# Patient Record
Sex: MALE | Race: WHITE | Employment: UNEMPLOYED | URBAN - METROPOLITAN AREA
[De-identification: names, ages, dates, MRNs, and addresses within clinical notes are randomized per-mention and may not be internally consistent; named-entity substitution may affect disease eponyms.]

---

## 2017-01-19 ENCOUNTER — OFFICE VISIT (OUTPATIENT)
Dept: CARDIOLOGY CLINIC | Age: 57
End: 2017-01-19

## 2017-01-19 VITALS
SYSTOLIC BLOOD PRESSURE: 98 MMHG | HEART RATE: 68 BPM | WEIGHT: 166 LBS | DIASTOLIC BLOOD PRESSURE: 67 MMHG | BODY MASS INDEX: 31.34 KG/M2 | OXYGEN SATURATION: 98 % | HEIGHT: 61 IN

## 2017-01-19 DIAGNOSIS — I25.83 CORONARY ARTERY DISEASE DUE TO LIPID RICH PLAQUE: Primary | ICD-10-CM

## 2017-01-19 DIAGNOSIS — I25.10 CORONARY ARTERY DISEASE DUE TO LIPID RICH PLAQUE: Primary | ICD-10-CM

## 2017-01-19 DIAGNOSIS — I10 ESSENTIAL HYPERTENSION WITH GOAL BLOOD PRESSURE LESS THAN 140/90: ICD-10-CM

## 2017-01-19 DIAGNOSIS — E78.00 PURE HYPERCHOLESTEROLEMIA: ICD-10-CM

## 2017-01-19 RX ORDER — ASPIRIN 81 MG/1
81 TABLET ORAL DAILY
Qty: 30 TAB | Refills: 7 | Status: SHIPPED | OUTPATIENT
Start: 2017-01-19

## 2017-01-19 NOTE — PROGRESS NOTES
1. Have you been to the ER, urgent care clinic since your last visit? Hospitalized since your last visit? No    2. Have you seen or consulted any other health care providers outside of the [address] [place] [address] since your last visit? Include any pap smears or colon screening.  No

## 2017-01-19 NOTE — MR AVS SNAPSHOT
Visit Information Date & Time Provider Department Dept. Phone Encounter #  
 1/19/2017 10:45 AM Augusto Brennan MD [facility] at [facility] [id number] Follow-up Instructions Return in about 6 months (around 7/19/2017). Your Appointments 1/19/2017 10:45 AM  
Follow Up with Augusto Brennan MD  
Cardio Specialist at [facility] [address] [facility]) Appt Note: 6 month f/u per tickler, only if it is not raining -km; pt confirmed 1/17/2017  
 [facility] [id number] Upcoming Health Maintenance Date Due Hepatitis C Screening 1960 FOBT Q 1 YEAR AGE 50-75 7/27/2008 INFLUENZA AGE 9 TO ADULT 8/1/2016 DTaP/Tdap/Td series (2 - Td) 11/2/2021 Allergies as of 1/19/2017  Review Complete On: 1/19/2017 By: Harlan Araujo RN Severity Noted Reaction Type Reactions Aspirin  01/16/2015   Intolerance Other (comments) Stomach bleed Current Immunizations  Reviewed on 11/2/2011 Name Date Influenza Vaccine Whole 10/2/2011 Pneumococcal Vaccine (Unspecified Type) 11/2/2011 TDAP Vaccine 11/2/2011 Not reviewed this visit Vitals BP Pulse Height(growth percentile) Weight(growth percentile) SpO2 BMI  
 98/67 68 5' 1\" (1.549 m) 166 lb (75.3 kg) 98% 31.37 kg/m2 Smoking Status Current Some Day Smoker BMI and BSA Data Body Mass Index Body Surface Area  
 31.37 kg/m 2 1.8 m 2 Preferred Pharmacy Pharmacy Name Phone [employer] [facility] [facility] Your Updated Medication List  
  
   
This list is accurate as of: 1/19/17 10:44 AM.  Always use your most recent med list.  
  
  
  
  
 aspirin delayed-release 81 mg tablet Take 1 Tab by mouth daily. atorvastatin 80 mg tablet Commonly known as:  LIPITOR  
TAKE ONE TABLET BY MOUTH NIGHTLY  
  
 butalbital-acetaminophen-caffeine -40 mg per tablet Commonly known as:  Sheirne Elissa Take 1 Tab by mouth two (2) times daily as needed for Pain.  
  
 escitalopram oxalate 10 mg tablet Commonly known as:  Cherre Jews Take 10 mg by mouth daily. furosemide 20 mg tablet Commonly known as:  LASIX Take 1 Tab by mouth as needed. gabapentin 300 mg capsule Commonly known as:  NEURONTIN Take 1 capsule by mouth once in the morning and 2 capsules at bedtime for leg cramps. nitroglycerin 0.4 mg SL tablet Commonly known as:  NITROSTAT  
1 Tab by SubLINGual route every five (5) minutes as needed for Chest Pain.  
  
 pantoprazole 40 mg tablet Commonly known as:  PROTONIX Take 40 mg by mouth daily. potassium chloride 20 mEq tablet Commonly known as:  K-DUR, KLOR-CON Take 20 mEq by mouth daily. topiramate 50 mg tablet Commonly known as:  TOPAMAX Take 50 mg by mouth two (2) times a day. Indications: MIGRAINE PREVENTION  
  
 zolpidem 10 mg tablet Commonly known as:  AMBIEN Take  by mouth nightly as needed for Sleep. Follow-up Instructions Return in about 6 months (around 7/19/2017). Patient Instructions Stop Lopressor Start Aspirin 81mg Daily Introducing [facility]! Dear Kathleen Malone: Thank you for requesting a [employer] account. Our records indicate that you have previously registered for a [employer] account but its currently inactive. Please call our [employer] support line at [phone]. Additional Information If you have questions, please visit the Frequently Asked Questions section of the [employer] website at [email or web address] Remember, [employer] is NOT to be used for urgent needs. For medical emergencies, dial 911. Now available from your iPhone and Android! Please provide this summary of care documentation to your next provider. Your primary care clinician is listed as Ramona Melara. If you have any questions after today's visit, please call [phone].

## 2017-01-19 NOTE — PROGRESS NOTES
Cardiovascular Specialists    Mr. Gaby Barroso is a 60 y.o. male with a history of coronary artery disease, status post three vessel CABG in January, 2016, hypertension, hyperlipidemia, tobacco abuse disorder. Mr. Gaby Barroso is here today for follow up appointment. He says that for the last two weeks he has been experiencing excessive coughing, runny nose and congestion. He feels like he has had the flu. He has some cough with yellowish sputum production, however he does not have any fever or chills. He has appointment with primary care provider early next week. Fortunately he does not have any fever at this time. He says that every time he coughs he has some chest discomfort at the site of bypass surgery. He denies any palpitations, presyncope or syncope. Past Medical History   Diagnosis Date    CAD (coronary artery disease)     Chronic pain     GI bleed 11/2/2011    Hernia     HLD (hyperlipidemia)     HTN (hypertension)     S/P CABG x 3 01/16     LIMA to LAD, SVG to PDA and OM1    Tobacco abuse          Past Surgical History   Procedure Laterality Date    Hx hernia repair Right      15 years    Hx hernia repair Left 2/10/2015     Left inguinal hernia repair       Current Outpatient Prescriptions   Medication Sig    aspirin delayed-release 81 mg tablet Take 1 Tab by mouth daily.  atorvastatin (LIPITOR) 80 mg tablet TAKE ONE TABLET BY MOUTH NIGHTLY    butalbital-acetaminophen-caffeine (FIORICET, ESGIC) -40 mg per tablet Take 1 Tab by mouth two (2) times daily as needed for Pain.  nitroglycerin (NITROSTAT) 0.4 mg SL tablet 1 Tab by SubLINGual route every five (5) minutes as needed for Chest Pain.  topiramate (TOPAMAX) 50 mg tablet Take 50 mg by mouth two (2) times a day. Indications: MIGRAINE PREVENTION    pantoprazole (PROTONIX) 40 mg tablet Take 40 mg by mouth daily.     escitalopram oxalate (LEXAPRO) 10 mg tablet Take 10 mg by mouth daily.  potassium chloride (K-DUR, KLOR-CON) 20 mEq tablet Take 20 mEq by mouth daily.  zolpidem (AMBIEN) 10 mg tablet Take  by mouth nightly as needed for Sleep.  furosemide (LASIX) 20 mg tablet Take 1 Tab by mouth as needed.  gabapentin (NEURONTIN) 300 mg capsule Take 1 capsule by mouth once in the morning and 2 capsules at bedtime for leg cramps. No current facility-administered medications for this visit. Allergies and Sensitivities:  Allergies   Allergen Reactions    Aspirin Other (comments)     Stomach bleed       Family History:  Family History   Problem Relation Age of Onset    Diabetes Sister        Social History:  Social History   Substance Use Topics    Smoking status: Current Some Day Smoker     Packs/day: 0.50     Types: Cigarettes     Last attempt to quit: 1/16/2016    Smokeless tobacco: Never Used    Alcohol use No     He  reports that he has been smoking Cigarettes. He has been smoking about 0.50 packs per day. He has never used smokeless tobacco.  He  reports that he does not drink alcohol. Review of Systems:  Cardiac symptoms as noted above in HPI. All others negative. Denies skin rash, blurring vision, photophobia, neck pain, hemoptysis, chronic cough, nausea, vomiting, hematuria, burning micturition, BRBPR, chronic headaches. Physical Exam:  BP Readings from Last 3 Encounters:   01/19/17 98/67   07/28/16 114/68   03/10/16 111/76         Pulse Readings from Last 3 Encounters:   01/19/17 68   07/28/16 (!) 55   03/10/16 (!) 54          Wt Readings from Last 3 Encounters:   01/19/17 166 lb (75.3 kg)   07/28/16 164 lb (74.4 kg)   03/10/16 161 lb (73 kg)       Constitutional: Oriented to person, place, and time. HENT: Head: Normocephalic and atraumatic. Neck: No JVD present. Cardiovascular: Regular rhythm. No murmur, gallop or rubs appreciated  Lung: Breath sounds normal. No respiratory distress.  No ronchi or rales appreciated  Abdominal: No tenderness. No rebound and no guarding. Musculoskeletal: There is no lower extremity edema. No cynosis    Review of Data  LABS:   Lab Results   Component Value Date/Time    Sodium 136 01/23/2016 03:40 AM    Potassium 3.3 01/23/2016 03:40 AM    Chloride 101 01/23/2016 03:40 AM    CO2 28 01/23/2016 03:40 AM    Glucose 111 01/23/2016 03:40 AM    BUN 18 01/23/2016 03:40 AM    Creatinine 1.00 01/23/2016 03:40 AM     Lipids Latest Ref Rng & Units 1/13/2016   Chol, Total <200 MG/(H)   HDL 40 - 60 MG/DL 37(L)   LDL 0 - 100 MG/(H)   Trig <150 MG/(H)   Chol/HDL Ratio 0 - 5.0   5.5(H)     Lab Results   Component Value Date/Time    ALT 26 01/11/2016 10:50 PM     Lab Results   Component Value Date/Time    Hemoglobin A1c 5.9 01/15/2016 01:55 PM       EKG    ECHO 901/16)  Left ventricle: Size was normal. Systolic function was normal. Ejection  fraction was estimated in the range of 55 % to 60 %. There were no  regional wall motion abnormalities. Wall thickness was normal.  Right ventricle: The size was normal.  Left atrium: Size was normal.  Right atrium: Size was normal.  Mitral valve: There was mild regurgitation. Aortic valve: There was no stenosis. There was no regurgitation. Tricuspid valve: There was mild regurgitation. CATHETERIZATION (01/16)   Left ventricle end-diastolic pressure was 77-12 mmHg. No evidence of aortic stenosis. Estimated ejection fraction on manual injection was 55%. No significant wall motion abnormality. LM: Angiographically normal.   LAD: Proximal mid 50-70% calcified haziness. Mid LAD had another 70% calcified tubular haziness lesion with KAROL 2 flow. D1: Ostial 50%, small to medium caliber vessel. LCX/OM: Proximal normal. OM1 has 90% disease at the bifurcation. Distally bifurcates without any significant obstructive disease. OM2: Small to medium caliber vessel with evidence of proximal long disease up to 70%.    RCA: Dominant, proximal and mid RCA has diffuse disease with 2 or 3 lesions up to 95-99%. Distal RCA has 30-40% luminal irregularities,  otherwise no significant obstructive disease. IMPRESSION & PLAN:  Mr. Garry Sears is a 60 y.o. male with a history of coronary artery disease, hypertension, hyperlipidemia and tobacco abuse disorder. Coronary artery disease:  Ms. Garry Sears has a three vessel CABG in January, 2016. Chest pain with coughing at surgical site which is reproducible. Likely musculoskeletal in nature. Continue aspirin, and lipid lowering agent. Cardiac rehab coming weeks  BP on lower side. Will DC metoprolol and repeat BP in two weeks. Hypertension:  Blood pressure is on lower side. Occasional dizziness. Stop metoprolol and repeat BP in two weeks. Hyperlipidemia:  He is on Atorvastatin 80 mg daily. Continue same    Tobacco abuse disorder:   He used to smoke, however he has not smoked since his CABG surgery. Only one ciggarate here and there. Mr. Garry Sears has been experiencing some cough, sputum production, congestion, runny nose and upper respiratory infection, however this appears more like a flu or viral symptoms. He does not have any fever. He denies any chills. He has an appointment to address this at primary care provider on Tuesday. Meanwhile he was advised to go to the emergency department if he has any flu or chills or symptoms which have been progressing. He verbalizes to understand. Importance of diet and exercise was discussed with patient. This plan was discussed with patient who is in agreement. Thank you for allowing me to participate in patient care. Please feel free to call me if you have any question or concern. Jacqueline Matta MD  Please note: This document has been produced using voice recognition software. Unrecognized errors in transcription may be present.

## 2017-02-02 ENCOUNTER — TELEPHONE (OUTPATIENT)
Dept: CARDIOLOGY CLINIC | Age: 57
End: 2017-02-02

## 2017-02-17 NOTE — COMMUNICATION BODY
Cardiovascular Specialists    Mr. Shirlene Kim is a 60 y.o. male with a history of coronary artery disease, status post three vessel CABG in January, 2016, hypertension, hyperlipidemia, tobacco abuse disorder. Mr. Shirlene Kim is here today for follow up appointment. He says that for the last two weeks he has been experiencing excessive coughing, runny nose and congestion. He feels like he has had the flu. He has some cough with yellowish sputum production, however he does not have any fever or chills. He has appointment with primary care provider early next week. Fortunately he does not have any fever at this time. He says that every time he coughs he has some chest discomfort at the site of bypass surgery. He denies any palpitations, presyncope or syncope. Past Medical History   Diagnosis Date    CAD (coronary artery disease)     Chronic pain     GI bleed 11/2/2011    Hernia     HLD (hyperlipidemia)     HTN (hypertension)     S/P CABG x 3 01/16     LIMA to LAD, SVG to PDA and OM1    Tobacco abuse          Past Surgical History   Procedure Laterality Date    Hx hernia repair Right      15 years    Hx hernia repair Left 2/10/2015     Left inguinal hernia repair       Current Outpatient Prescriptions   Medication Sig    aspirin delayed-release 81 mg tablet Take 1 Tab by mouth daily.  atorvastatin (LIPITOR) 80 mg tablet TAKE ONE TABLET BY MOUTH NIGHTLY    butalbital-acetaminophen-caffeine (FIORICET, ESGIC) -40 mg per tablet Take 1 Tab by mouth two (2) times daily as needed for Pain.  nitroglycerin (NITROSTAT) 0.4 mg SL tablet 1 Tab by SubLINGual route every five (5) minutes as needed for Chest Pain.  topiramate (TOPAMAX) 50 mg tablet Take 50 mg by mouth two (2) times a day. Indications: MIGRAINE PREVENTION    pantoprazole (PROTONIX) 40 mg tablet Take 40 mg by mouth daily.     escitalopram oxalate (LEXAPRO) 10 mg tablet Take 10 mg by mouth daily.  potassium chloride (K-DUR, KLOR-CON) 20 mEq tablet Take 20 mEq by mouth daily.  zolpidem (AMBIEN) 10 mg tablet Take  by mouth nightly as needed for Sleep.  furosemide (LASIX) 20 mg tablet Take 1 Tab by mouth as needed.  gabapentin (NEURONTIN) 300 mg capsule Take 1 capsule by mouth once in the morning and 2 capsules at bedtime for leg cramps. No current facility-administered medications for this visit. Allergies and Sensitivities:  Allergies   Allergen Reactions    Aspirin Other (comments)     Stomach bleed       Family History:  Family History   Problem Relation Age of Onset    Diabetes Sister        Social History:  Social History   Substance Use Topics    Smoking status: Current Some Day Smoker     Packs/day: 0.50     Types: Cigarettes     Last attempt to quit: 1/16/2016    Smokeless tobacco: Never Used    Alcohol use No     He  reports that he has been smoking Cigarettes. He has been smoking about 0.50 packs per day. He has never used smokeless tobacco.  He  reports that he does not drink alcohol. Review of Systems:  Cardiac symptoms as noted above in HPI. All others negative. Denies skin rash, blurring vision, photophobia, neck pain, hemoptysis, chronic cough, nausea, vomiting, hematuria, burning micturition, BRBPR, chronic headaches. Physical Exam:  BP Readings from Last 3 Encounters:   01/19/17 98/67   07/28/16 114/68   03/10/16 111/76         Pulse Readings from Last 3 Encounters:   01/19/17 68   07/28/16 (!) 55   03/10/16 (!) 54          Wt Readings from Last 3 Encounters:   01/19/17 166 lb (75.3 kg)   07/28/16 164 lb (74.4 kg)   03/10/16 161 lb (73 kg)       Constitutional: Oriented to person, place, and time. HENT: Head: Normocephalic and atraumatic. Neck: No JVD present. Cardiovascular: Regular rhythm. No murmur, gallop or rubs appreciated  Lung: Breath sounds normal. No respiratory distress.  No ronchi or rales appreciated  Abdominal: No tenderness. No rebound and no guarding. Musculoskeletal: There is no lower extremity edema. No cynosis    Review of Data  LABS:   Lab Results   Component Value Date/Time    Sodium 136 01/23/2016 03:40 AM    Potassium 3.3 01/23/2016 03:40 AM    Chloride 101 01/23/2016 03:40 AM    CO2 28 01/23/2016 03:40 AM    Glucose 111 01/23/2016 03:40 AM    BUN 18 01/23/2016 03:40 AM    Creatinine 1.00 01/23/2016 03:40 AM     Lipids Latest Ref Rng & Units 1/13/2016   Chol, Total <200 MG/(H)   HDL 40 - 60 MG/DL 37(L)   LDL 0 - 100 MG/(H)   Trig <150 MG/(H)   Chol/HDL Ratio 0 - 5.0   5.5(H)     Lab Results   Component Value Date/Time    ALT 26 01/11/2016 10:50 PM     Lab Results   Component Value Date/Time    Hemoglobin A1c 5.9 01/15/2016 01:55 PM       EKG    ECHO 901/16)  Left ventricle: Size was normal. Systolic function was normal. Ejection  fraction was estimated in the range of 55 % to 60 %. There were no  regional wall motion abnormalities. Wall thickness was normal.  Right ventricle: The size was normal.  Left atrium: Size was normal.  Right atrium: Size was normal.  Mitral valve: There was mild regurgitation. Aortic valve: There was no stenosis. There was no regurgitation. Tricuspid valve: There was mild regurgitation. CATHETERIZATION (01/16)   Left ventricle end-diastolic pressure was 60-44 mmHg. No evidence of aortic stenosis. Estimated ejection fraction on manual injection was 55%. No significant wall motion abnormality. LM: Angiographically normal.   LAD: Proximal mid 50-70% calcified haziness. Mid LAD had another 70% calcified tubular haziness lesion with KAROL 2 flow. D1: Ostial 50%, small to medium caliber vessel. LCX/OM: Proximal normal. OM1 has 90% disease at the bifurcation. Distally bifurcates without any significant obstructive disease. OM2: Small to medium caliber vessel with evidence of proximal long disease up to 70%.    RCA: Dominant, proximal and mid RCA has diffuse disease with 2 or 3 lesions up to 95-99%. Distal RCA has 30-40% luminal irregularities,  otherwise no significant obstructive disease. IMPRESSION & PLAN:  Mr. Alok Cates is a 60 y.o. male with a history of coronary artery disease, hypertension, hyperlipidemia and tobacco abuse disorder. Coronary artery disease:  Ms. Alok Cates has a three vessel CABG in January, 2016. Chest pain with coughing at surgical site which is reproducible. Likely musculoskeletal in nature. Continue aspirin, and lipid lowering agent. Cardiac rehab coming weeks  BP on lower side. Will DC metoprolol and repeat BP in two weeks. Hypertension:  Blood pressure is on lower side. Occasional dizziness. Stop metoprolol and repeat BP in two weeks. Hyperlipidemia:  He is on Atorvastatin 80 mg daily. Continue same    Tobacco abuse disorder:   He used to smoke, however he has not smoked since his CABG surgery. Only one ciggarate here and there. Mr. Alok Cates has been experiencing some cough, sputum production, congestion, runny nose and upper respiratory infection, however this appears more like a flu or viral symptoms. He does not have any fever. He denies any chills. He has an appointment to address this at primary care provider on Tuesday. Meanwhile he was advised to go to the emergency department if he has any flu or chills or symptoms which have been progressing. He verbalizes to understand. Importance of diet and exercise was discussed with patient. This plan was discussed with patient who is in agreement. Thank you for allowing me to participate in patient care. Please feel free to call me if you have any question or concern. Shanna Willams MD  Please note: This document has been produced using voice recognition software. Unrecognized errors in transcription may be present.

## 2017-02-27 RX ORDER — ATORVASTATIN CALCIUM 80 MG/1
TABLET, FILM COATED ORAL
Qty: 30 TAB | Refills: 0 | Status: SHIPPED | OUTPATIENT
Start: 2017-02-27 | End: 2017-07-27 | Stop reason: SDUPTHER

## 2017-07-27 ENCOUNTER — OFFICE VISIT (OUTPATIENT)
Dept: CARDIOLOGY CLINIC | Age: 57
End: 2017-07-27

## 2017-07-27 VITALS
BODY MASS INDEX: 32.66 KG/M2 | WEIGHT: 173 LBS | OXYGEN SATURATION: 97 % | HEART RATE: 68 BPM | HEIGHT: 61 IN | DIASTOLIC BLOOD PRESSURE: 95 MMHG | SYSTOLIC BLOOD PRESSURE: 150 MMHG

## 2017-07-27 DIAGNOSIS — K92.1 BLOOD IN STOOL: ICD-10-CM

## 2017-07-27 DIAGNOSIS — R60.9 EDEMA, UNSPECIFIED TYPE: Primary | ICD-10-CM

## 2017-07-27 RX ORDER — ATORVASTATIN CALCIUM 80 MG/1
TABLET, FILM COATED ORAL
Qty: 30 TAB | Refills: 6 | Status: SHIPPED | OUTPATIENT
Start: 2017-07-27 | End: 2018-07-30 | Stop reason: SDUPTHER

## 2017-07-27 RX ORDER — FUROSEMIDE 20 MG/1
TABLET ORAL DAILY
COMMUNITY

## 2017-07-27 RX ORDER — FUROSEMIDE 20 MG/1
20 TABLET ORAL DAILY
Qty: 30 TAB | Refills: 6 | Status: SHIPPED | OUTPATIENT
Start: 2017-07-27 | End: 2017-08-09

## 2017-07-27 RX ORDER — METOPROLOL TARTRATE 25 MG/1
12.5 TABLET, FILM COATED ORAL 2 TIMES DAILY
Qty: 30 TAB | Refills: 6 | Status: SHIPPED | OUTPATIENT
Start: 2017-07-27 | End: 2018-09-11 | Stop reason: SDUPTHER

## 2017-07-27 RX ORDER — CLOPIDOGREL BISULFATE 75 MG/1
75 TABLET ORAL DAILY
Qty: 30 TAB | Refills: 6 | Status: SHIPPED | OUTPATIENT
Start: 2017-07-27 | End: 2018-02-27 | Stop reason: SDUPTHER

## 2017-07-27 NOTE — PROGRESS NOTES
[facility]    Mr. Mejia Hinkle is a 59 y.o. male with a history of coronary artery disease, status post three vessel CABG in January, 2016, hypertension, hyperlipidemia, tobacco abuse disorder. Mr. Mejia Hinkle is here today for follow up appointment. He tells me that he was taking aspirin, however he started having stomach upset and some spots of blood in the stool so he stopped taking aspirin just because he had a bleeding problem with aspirin in the past and stomach problem. He did not inform us. He is also not taking any of the medication, including his statin and other medication. He was given water pill by primary care provider, however he has not been taking that either. He has run out of it. He says he has been having lower extremity swelling with some rash after he had a swelling. He did not seek medical attention. He denies any new medications. He denies any chest pain or chest tightness. He denies any PND. He uses 1-2 pillows at night. He denies any presyncope or syncope. Past Medical History:   Diagnosis Date    CAD (coronary artery disease)     Chronic pain     GI bleed 11/2/2011    Hernia     HLD (hyperlipidemia)     HTN (hypertension)     Non compliance w medication regimen     S/P CABG x 3 01/16    LIMA to LAD, SVG to PDA and OM1    Tobacco abuse          Past Surgical History:   Procedure Laterality Date    HX HERNIA REPAIR Right     15 years    HX HERNIA REPAIR Left 2/10/2015    Left inguinal hernia repair       Current Outpatient Prescriptions   Medication Sig    furosemide (LASIX) 20 mg tablet Take  by mouth daily.  atorvastatin (LIPITOR) 80 mg tablet TAKE TABLET BY MOUTH ONCE NIGHTLY    aspirin delayed-release 81 mg tablet Take 1 Tab by mouth daily.  butalbital-acetaminophen-caffeine (FIORICET, ESGIC) -40 mg per tablet Take 1 Tab by mouth two (2) times daily as needed for Pain.     potassium chloride (K-DUR, KLOR-CON) 20 mEq tablet Take 20 mEq by mouth daily.  zolpidem (AMBIEN) 10 mg tablet Take  by mouth nightly as needed for Sleep.  nitroglycerin (NITROSTAT) 0.4 mg SL tablet 1 Tab by SubLINGual route every five (5) minutes as needed for Chest Pain.  topiramate (TOPAMAX) 50 mg tablet Take 50 mg by mouth two (2) times a day. Indications: MIGRAINE PREVENTION    pantoprazole (PROTONIX) 40 mg tablet Take 40 mg by mouth daily.  gabapentin (NEURONTIN) 300 mg capsule Take 1 capsule by mouth once in the morning and 2 capsules at bedtime for leg cramps.  escitalopram oxalate (LEXAPRO) 10 mg tablet Take 10 mg by mouth daily. No current facility-administered medications for this visit. Allergies and Sensitivities:  Allergies   Allergen Reactions    Aspirin Other (comments)     Stomach bleed       Family History:  Family History   Problem Relation Age of Onset    Diabetes Sister        Social History:  Social History   Substance Use Topics    Smoking status: Current Some Day Smoker     Packs/day: 0.50     Types: Cigarettes     Last attempt to quit: 1/16/2016    Smokeless tobacco: Never Used    Alcohol use No     He  reports that he has been smoking Cigarettes. He has been smoking about 0.50 packs per day. He has never used smokeless tobacco.  He  reports that he does not drink alcohol. Review of Systems:  Cardiac symptoms as noted above in HPI. All others negative. Denies skin rash, blurring vision, photophobia, neck pain, hemoptysis, chronic cough, nausea, vomiting, hematuria, burning micturition, BRBPR, chronic headaches.     Physical Exam:  BP Readings from Last 3 Encounters:   07/27/17 (!) 150/95   01/19/17 98/67   07/28/16 114/68         Pulse Readings from Last 3 Encounters:   07/27/17 68   01/19/17 68   07/28/16 (!) 55          Wt Readings from Last 3 Encounters:   07/27/17 173 lb (78.5 kg)   01/19/17 166 lb (75.3 kg)   07/28/16 164 lb (74.4 kg)       Constitutional: Oriented to person, place, and time. HENT: Head: Normocephalic and atraumatic. Neck: No JVD present. Cardiovascular: Regular rhythm. No murmur, gallop or rubs appreciated  Lung: Breath sounds normal. No respiratory distress. No ronchi or rales appreciated  Abdominal: No tenderness. No rebound and no guarding. Musculoskeletal: There is no lower extremity edema. No cynosis    Review of Data  LABS:   Lab Results   Component Value Date/Time    Sodium 136 01/23/2016 03:40 AM    Potassium 3.3 01/23/2016 03:40 AM    Chloride 101 01/23/2016 03:40 AM    CO2 28 01/23/2016 03:40 AM    Glucose 111 01/23/2016 03:40 AM    BUN 18 01/23/2016 03:40 AM    Creatinine 1.00 01/23/2016 03:40 AM     Lipids Latest Ref Rng & Units 1/13/2016   Chol, Total <200 MG/(H)   HDL 40 - 60 MG/DL 37(L)   LDL 0 - 100 MG/(H)   Trig <150 MG/(H)   Chol/HDL Ratio 0 - 5.0   5.5(H)   Some recent data might be hidden     Lab Results   Component Value Date/Time    ALT (SGPT) 26 01/11/2016 10:50 PM     Lab Results   Component Value Date/Time    Hemoglobin A1c 5.9 01/15/2016 01:55 PM       EKG    ECHO 901/16)  Left ventricle: Size was normal. Systolic function was normal. Ejection  fraction was estimated in the range of 55 % to 60 %. There were no  regional wall motion abnormalities. Wall thickness was normal.  Right ventricle: The size was normal.  Left atrium: Size was normal.  Right atrium: Size was normal.  Mitral valve: There was mild regurgitation. Aortic valve: There was no stenosis. There was no regurgitation. Tricuspid valve: There was mild regurgitation. CATHETERIZATION (01/16)   Left ventricle end-diastolic pressure was 90-16 mmHg. No evidence of aortic stenosis. Estimated ejection fraction on manual injection was 55%. No significant wall motion abnormality. LM: Angiographically normal.   LAD: Proximal mid 50-70% calcified haziness. Mid LAD had another 70% calcified tubular haziness lesion with KAROL 2 flow.    D1: Ostial 50%, small to medium caliber vessel. LCX/OM: Proximal normal. OM1 has 90% disease at the bifurcation. Distally bifurcates without any significant obstructive disease. OM2: Small to medium caliber vessel with evidence of proximal long disease up to 70%. RCA: Dominant, proximal and mid RCA has diffuse disease with 2 or 3 lesions up to 95-99%. Distal RCA has 30-40% luminal irregularities,  otherwise no significant obstructive disease. IMPRESSION & PLAN:  Mr. Claudetta Pinna is a 59 y.o. male with a history of coronary artery disease, hypertension, hyperlipidemia and tobacco abuse disorder. Coronary artery disease:  Ms. Claudetta Pinna has a three vessel CABG in January, 2016. No angina currently  Stopped ASA because of stomach discomfort and ?? Possible blood in stool. Now resolved. Not on BB  Stopped statin  Will ask to take plavix 75 mg daily. Stop ASA. Ask him to see GI physician to evaluate PUD and for blood in stool. Chest pain with coughing at surgical site which is reproducible. Likely musculoskeletal in nature. Hypertension:  Blood pressure is high today. Will restart BB metoprolol 12.5 mg BID. Hyperlipidemia:  He is on Atorvastatin 80 mg daily. Has stopped taking it. Will restart    Tobacco abuse disorder:   He used to smoke, however he has not smoked since his CABG surgery. Only one ciggarate here and there. Edema: Pitting LE edema. Start lasix 20 mg daily. Advised to take banana a day. ECHO to eval EF in setting of edema and CAD  No rales or JVD on exam    He will follow up with PCP for his new LE rash. No new meds or not itchy. Importance of diet and exercise was discussed with patient. This plan was discussed with patient who is in agreement. Thank you for allowing me to participate in patient care. Please feel free to call me if you have any question or concern. Emilia Torres MD  Please note: This document has been produced using voice recognition software.  Unrecognized errors in transcription may be present.

## 2017-07-27 NOTE — LETTER
Patient:  Apolonia Bull [facility] YOB: 1960 Date of Visit: 7/27/2017 [phone] [id number] VIA Facsimile: [phone] 
 : 
 
 
                                                           [facility] Mr. [facility] is a 59 y.o. male with a history of coronary artery disease, status post three vessel CABG in January, 2016, hypertension, hyperlipidemia, tobacco abuse disorder. Mr. [facility] is here today for follow up appointment. He tells me that he was taking aspirin, however he started having stomach upset and some spots of blood in the stool so he stopped taking aspirin just because he had a bleeding problem with aspirin in the past and stomach problem. He did not inform us. He is also not taking any of the medication, including his statin and other medication. He was given water pill by primary care provider, however he has not been taking that either. He has run out of it. He says he has been having lower extremity swelling with some rash after he had a swelling. He did not seek medical attention. He denies any new medications. He denies any chest pain or chest tightness. He denies any PND. He uses 1-2 pillows at night. He denies any presyncope or syncope. Past Medical History:  
Diagnosis Date  CAD (coronary artery disease)  Chronic pain  GI bleed 11/2/2011  Hernia  HLD (hyperlipidemia)  HTN (hypertension)  Non compliance w medication regimen  S/P CABG x 3 01/16 LIMA to LAD, SVG to PDA and OM1  Tobacco abuse Past Surgical History:  
Procedure Laterality Date  HX HERNIA REPAIR Right 15 years  HX HERNIA REPAIR Left 2/10/2015 Left inguinal hernia repair Current Outpatient Prescriptions Medication Sig  furosemide (LASIX) 20 mg tablet Take  by mouth daily.  atorvastatin (LIPITOR) 80 mg tablet TAKE TABLET BY MOUTH ONCE NIGHTLY  aspirin delayed-release 81 mg tablet Take 1 Tab by mouth daily.  butalbital-acetaminophen-caffeine (FIORICET, ESGIC) -40 mg per tablet Take 1 Tab by mouth two (2) times daily as needed for Pain.  potassium chloride (K-DUR, KLOR-CON) 20 mEq tablet Take 20 mEq by mouth daily.  zolpidem (AMBIEN) 10 mg tablet Take  by mouth nightly as needed for Sleep.  nitroglycerin (NITROSTAT) 0.4 mg SL tablet 1 Tab by SubLINGual route every five (5) minutes as needed for Chest Pain.  topiramate (TOPAMAX) 50 mg tablet Take 50 mg by mouth two (2) times a day. Indications: MIGRAINE PREVENTION  pantoprazole (PROTONIX) 40 mg tablet Take 40 mg by mouth daily.  gabapentin (NEURONTIN) 300 mg capsule Take 1 capsule by mouth once in the morning and 2 capsules at bedtime for leg cramps.  escitalopram oxalate (LEXAPRO) 10 mg tablet Take 10 mg by mouth daily. No current facility-administered medications for this visit. Allergies and Sensitivities: 
Allergies Allergen Reactions  Aspirin Other (comments) Stomach bleed Family History: 
Family History Problem Relation Age of Onset  Diabetes Sister Social History: 
Social History Substance Use Topics  Smoking status: Current Some Day Smoker Packs/day: 0.50 Types: Cigarettes Last attempt to quit: 1/16/2016  Smokeless tobacco: Never Used  Alcohol use No  
 
He  reports that he has been smoking Cigarettes. He has been smoking about 0.50 packs per day. He has never used smokeless tobacco.  He  reports that he does not drink alcohol. Review of Systems: 
Cardiac symptoms as noted above in HPI. All others negative. Denies skin rash, blurring vision, photophobia, neck pain, hemoptysis, chronic cough, nausea, vomiting, hematuria, burning micturition, BRBPR, chronic headaches. Physical Exam: 
BP Readings from Last 3 Encounters:  
07/27/17 (!) 150/95  
01/19/17 98/67  
07/28/16 114/68 Pulse Readings from Last 3 Encounters:  
07/27/17 68  
01/19/17 68  
07/28/16 (!) 55 Wt Readings from Last 3 Encounters:  
07/27/17 173 lb (78.5 kg) 01/19/17 166 lb (75.3 kg) 07/28/16 164 lb (74.4 kg) Constitutional: Oriented to person, place, and time. HENT: Head: Normocephalic and atraumatic. Neck: No JVD present. Cardiovascular: Regular rhythm. No murmur, gallop or rubs appreciated Lung: Breath sounds normal. No respiratory distress. No ronchi or rales appreciated Abdominal: No tenderness. No rebound and no guarding. Musculoskeletal: There is no lower extremity edema. No cynosis Review of Data LABS:  
Lab Results Component Value Date/Time Sodium 136 01/23/2016 03:40 AM  
 Potassium 3.3 01/23/2016 03:40 AM  
 Chloride 101 01/23/2016 03:40 AM  
 CO2 28 01/23/2016 03:40 AM  
 Glucose 111 01/23/2016 03:40 AM  
 BUN 18 01/23/2016 03:40 AM  
 Creatinine 1.00 01/23/2016 03:40 AM  
 
Lipids Latest Ref Rng & Units 1/13/2016 Chol, Total <200 MG/(H) HDL 40 - 60 MG/DL 37(L) LDL 0 - 100 MG/(H) Trig <150 MG/(H) Chol/HDL Ratio 0 - 5.0   5.5(H) Some recent data might be hidden Lab Results Component Value Date/Time ALT (SGPT) 26 01/11/2016 10:50 PM  
 
Lab Results Component Value Date/Time Hemoglobin A1c 5.9 01/15/2016 01:55 PM  
 
 
EKG 
 
ECHO 901/16) Left ventricle: Size was normal. Systolic function was normal. Ejection 
fraction was estimated in the range of 55 % to 60 %. There were no 
regional wall motion abnormalities. Wall thickness was normal. 
Right ventricle: The size was normal. 
Left atrium: Size was normal. 
Right atrium: Size was normal. 
Mitral valve: There was mild regurgitation. Aortic valve: There was no stenosis. There was no regurgitation. Tricuspid valve: There was mild regurgitation. CATHETERIZATION (01/16) Left ventricle end-diastolic pressure was 62-91 mmHg. No evidence of aortic stenosis. Estimated ejection fraction on manual injection was 55%. No significant wall motion abnormality. LM: Angiographically normal. 
 LAD: Proximal mid 50-70% calcified haziness. Mid LAD had another 70% calcified tubular haziness lesion with KAROL 2 flow. D1: Ostial 50%, small to medium caliber vessel. LCX/OM: Proximal normal. OM1 has 90% disease at the bifurcation. Distally bifurcates without any significant obstructive disease. OM2: Small to medium caliber vessel with evidence of proximal long disease up to 70%. RCA: Dominant, proximal and mid RCA has diffuse disease with 2 or 3 lesions up to 95-99%. Distal RCA has 30-40% luminal irregularities,  otherwise no significant obstructive disease. IMPRESSION & PLAN: 
Mr. Major Comer is a 59 y.o. male with a history of coronary artery disease, hypertension, hyperlipidemia and tobacco abuse disorder. Coronary artery disease:  Ms. Major Comer has a three vessel CABG in January, 2016. No angina currently Stopped ASA because of stomach discomfort and ?? Possible blood in stool. Now resolved. Not on BB Stopped statin Will ask to take plavix 75 mg daily. Stop ASA. Ask him to see GI physician to evaluate PUD and for blood in stool. Chest pain with coughing at surgical site which is reproducible. Likely musculoskeletal in nature. Hypertension:  Blood pressure is high today. Will restart BB metoprolol 12.5 mg BID. Hyperlipidemia:  He is on Atorvastatin 80 mg daily. Has stopped taking it. Will restart Tobacco abuse disorder:   He used to smoke, however he has not smoked since his CABG surgery. Only one ciggarate here and there. Edema: Pitting LE edema. Start lasix 20 mg daily. Advised to take banana a day. ECHO to eval EF in setting of edema and CAD No rales or JVD on exam 
 
He will follow up with PCP for his new LE rash. No new meds or not itchy. Importance of diet and exercise was discussed with patient. This plan was discussed with patient who is in agreement. Thank you for allowing me to participate in patient care. Please feel free to call me if you have any question or concern. Tavo Grimm MD 
Please note: This document has been produced using voice recognition software. Unrecognized errors in transcription may be present. Sincerely, Montserrat Armendariz MD

## 2017-07-27 NOTE — MR AVS SNAPSHOT
Visit Information Date & Time Provider Department Dept. Phone Encounter #  
 7/27/2017 11:15 AM Augusto Delgado MD [address] [place] [address] Specialist at [facility] [phone] [id number] Follow-up Instructions Return in about 9 days (around 8/5/2017). Upcoming Health Maintenance Date Due Hepatitis C Screening 1960 FOBT Q 1 YEAR AGE 50-75 7/27/2008 INFLUENZA AGE 9 TO ADULT 8/1/2017 DTaP/Tdap/Td series (2 - Td) 11/2/2021 Allergies as of 7/27/2017  Review Complete On: 7/27/2017 By: Narcisa Oquendo RN Severity Noted Reaction Type Reactions Aspirin  01/16/2015   Intolerance Other (comments) Stomach bleed Current Immunizations  Reviewed on 11/2/2011 Name Date Influenza Vaccine Whole 10/2/2011 TDAP Vaccine 11/2/2011 ZZZ-RETIRED (DO NOT USE) Pneumococcal Vaccine (Unspecified Type) 11/2/2011 Not reviewed this visit You Were Diagnosed With   
  
 Codes Comments Edema, unspecified type    -  Primary ICD-10-CM: R60.9 ICD-9-CM: 762. 3 Blood in stool     ICD-10-CM: K92.1 ICD-9-CM: 578.1 Vitals BP Pulse Height(growth percentile) Weight(growth percentile) SpO2 BMI  
 (!) 150/95 68 5' 1\" (1.549 m) 173 lb (78.5 kg) 97% 32.69 kg/m2 Smoking Status Current Some Day Smoker BMI and BSA Data Body Mass Index Body Surface Area  
 32.69 kg/m 2 1.84 m 2 Preferred Pharmacy Pharmacy Name Phone [address] Your Updated Medication List  
  
   
This list is accurate as of: 7/27/17 12:10 PM.  Always use your most recent med list.  
  
  
  
  
 aspirin delayed-release 81 mg tablet Take 1 Tab by mouth daily. atorvastatin 80 mg tablet Commonly known as:  LIPITOR  
TAKE TABLET BY MOUTH ONCE NIGHTLY  
  
 butalbital-acetaminophen-caffeine -40 mg per tablet Commonly known as:  Benedicto Cole  
 Take 1 Tab by mouth two (2) times daily as needed for Pain.  
  
 escitalopram oxalate 10 mg tablet Commonly known as:  Leti Angst Take 10 mg by mouth daily. furosemide 20 mg tablet Commonly known as:  LASIX Take  by mouth daily. gabapentin 300 mg capsule Commonly known as:  NEURONTIN Take 1 capsule by mouth once in the morning and 2 capsules at bedtime for leg cramps. nitroglycerin 0.4 mg SL tablet Commonly known as:  NITROSTAT  
1 Tab by SubLINGual route every five (5) minutes as needed for Chest Pain.  
  
 pantoprazole 40 mg tablet Commonly known as:  PROTONIX Take 40 mg by mouth daily. potassium chloride 20 mEq tablet Commonly known as:  K-DUR, KLOR-CON Take 20 mEq by mouth daily. topiramate 50 mg tablet Commonly known as:  TOPAMAX Take 50 mg by mouth two (2) times a day. Indications: MIGRAINE PREVENTION  
  
 zolpidem 10 mg tablet Commonly known as:  AMBIEN Take  by mouth nightly as needed for Sleep. We Performed the Following REFERRAL TO GASTROENTEROLOGY [LYO11 Custom] Comments:  
 Please evaluate patient. Patient stopped his aspirin due to blood in his stool. Patient has CAD and needs to be on Aspirin or Plavix. Follow-up Instructions Return in about 9 days (around 8/5/2017). To-Do List   
 07/27/2017 ECHO:  2D ECHO COMPLETE ADULT (TTE) W OR WO CONTR Referral Information Referral ID Referred By Referred To  
  
 [id number] [facility] [address], [address] [place] [address] Road Phone: [phone] Fax: [phone] Visits Status Start Date End Date 1 New Request 7/27/17 7/27/18 If your referral has a status of pending review or denied, additional information will be sent to support the outcome of this decision. Introducing [facility]!    
 [facility] introduces eTipping patient portal. Now you can access parts of your medical record, email your doctor's office, and request medication refills online. 1. In your internet browser, go to [email or web address] 2. Click on the First Time User? Click Here link in the Sign In box. You will see the New Member Sign Up page. 3. Enter your [employer] Access Code exactly as it appears below. You will not need to use this code after youve completed the sign-up process. If you do not sign up before the expiration date, you must request a new code. · [employer] Access Code: [email or web address] Expires: 10/25/2017 11:26 AM 
 
4. Enter the last four digits of your Social Security Number (xxxx) and Date of Birth (mm/dd/yyyy) as indicated and click Submit. You will be taken to the next sign-up page. 5. Create a [employer] ID. This will be your [employer] login ID and cannot be changed, so think of one that is secure and easy to remember. 6. Create a [employer] password. You can change your password at any time. 7. Enter your Password Reset Question and Answer. This can be used at a later time if you forget your password. 8. Enter your e-mail address. You will receive e-mail notification when new information is available in [address] 9. Click Sign Up. You can now view and download portions of your medical record. 10. Click the Download Summary menu link to download a portable copy of your medical information. If you have questions, please visit the Frequently Asked Questions section of the [employer] website. Remember, [employer] is NOT to be used for urgent needs. For medical emergencies, dial 911. Now available from your iPhone and Android! Please provide this summary of care documentation to your next provider. Your primary care clinician is listed as Fernando Foss. If you have any questions after today's visit, please call [phone].

## 2017-08-09 ENCOUNTER — HOSPITAL ENCOUNTER (EMERGENCY)
Age: 57
Discharge: HOME | End: 2017-08-09
Attending: EMERGENCY MEDICINE
Payer: MEDICAID

## 2017-08-09 ENCOUNTER — HOSPITAL ENCOUNTER (OUTPATIENT)
Dept: NON INVASIVE DIAGNOSTICS | Age: 57
Discharge: HOME | End: 2017-08-09
Attending: INTERNAL MEDICINE
Payer: MEDICAID

## 2017-08-09 VITALS
SYSTOLIC BLOOD PRESSURE: 134 MMHG | RESPIRATION RATE: 18 BRPM | HEART RATE: 79 BPM | DIASTOLIC BLOOD PRESSURE: 92 MMHG | OXYGEN SATURATION: 100 % | TEMPERATURE: 97.8 F

## 2017-08-09 DIAGNOSIS — R21 RASH AND OTHER NONSPECIFIC SKIN ERUPTION: Primary | ICD-10-CM

## 2017-08-09 DIAGNOSIS — L29.9 PRURITIC DERMATITIS: ICD-10-CM

## 2017-08-09 DIAGNOSIS — R60.9 EDEMA, UNSPECIFIED TYPE: ICD-10-CM

## 2017-08-09 LAB
ANION GAP BLD CALC-SCNC: 18 MMOL/L (ref 10–20)
BASOPHILS # BLD AUTO: 0 K/UL (ref 0–0.06)
BASOPHILS # BLD: 0 % (ref 0–2)
BUN BLD-MCNC: 12 MG/DL (ref 7–18)
CA-I BLD-MCNC: 1.2 MMOL/L (ref 1.12–1.32)
CHLORIDE BLD-SCNC: 106 MMOL/L (ref 100–108)
CO2 BLD-SCNC: 23 MMOL/L (ref 19–24)
CREAT UR-MCNC: 1.1 MG/DL (ref 0.6–1.3)
DIFFERENTIAL METHOD BLD: NORMAL
EOSINOPHIL # BLD: 0.1 K/UL (ref 0–0.4)
EOSINOPHIL NFR BLD: 1 % (ref 0–5)
ERYTHROCYTE [DISTWIDTH] IN BLOOD BY AUTOMATED COUNT: 13.3 % (ref 11.6–14.5)
GLUCOSE BLD STRIP.AUTO-MCNC: 140 MG/DL (ref 74–106)
HCT VFR BLD AUTO: 44.7 % (ref 36–48)
HCT VFR BLD CALC: 46 % (ref 36–49)
HGB BLD-MCNC: 15.5 G/DL (ref 13–16)
HGB BLD-MCNC: 15.6 G/DL (ref 12–16)
LYMPHOCYTES # BLD AUTO: 37 % (ref 21–52)
LYMPHOCYTES # BLD: 3.3 K/UL (ref 0.9–3.6)
MCH RBC QN AUTO: 32.6 PG (ref 24–34)
MCHC RBC AUTO-ENTMCNC: 34.7 G/DL (ref 31–37)
MCV RBC AUTO: 93.9 FL (ref 74–97)
MONOCYTES # BLD: 0.6 K/UL (ref 0.05–1.2)
MONOCYTES NFR BLD AUTO: 7 % (ref 3–10)
NEUTS SEG # BLD: 5.1 K/UL (ref 1.8–8)
NEUTS SEG NFR BLD AUTO: 55 % (ref 40–73)
PLATELET # BLD AUTO: 316 K/UL (ref 135–420)
PMV BLD AUTO: 9.4 FL (ref 9.2–11.8)
POTASSIUM BLD-SCNC: 3.8 MMOL/L (ref 3.5–5.5)
RBC # BLD AUTO: 4.76 M/UL (ref 4.7–5.5)
SODIUM BLD-SCNC: 141 MMOL/L (ref 136–145)
WBC # BLD AUTO: 9.1 K/UL (ref 4.6–13.2)

## 2017-08-09 PROCEDURE — 93306 TTE W/DOPPLER COMPLETE: CPT

## 2017-08-09 PROCEDURE — 99283 EMERGENCY DEPT VISIT LOW MDM: CPT

## 2017-08-09 PROCEDURE — 85025 COMPLETE CBC W/AUTO DIFF WBC: CPT | Performed by: EMERGENCY MEDICINE

## 2017-08-09 PROCEDURE — 80047 BASIC METABLC PNL IONIZED CA: CPT

## 2017-08-09 PROCEDURE — 74011250637 HC RX REV CODE- 250/637: Performed by: EMERGENCY MEDICINE

## 2017-08-09 RX ORDER — RISPERIDONE 2 MG/1
2 TABLET, FILM COATED ORAL
COMMUNITY

## 2017-08-09 RX ORDER — CETIRIZINE HCL 10 MG
10 TABLET ORAL
COMMUNITY
End: 2017-08-09

## 2017-08-09 RX ORDER — HYDROXYZINE PAMOATE 25 MG/1
25 CAPSULE ORAL
Status: COMPLETED | OUTPATIENT
Start: 2017-08-09 | End: 2017-08-09

## 2017-08-09 RX ORDER — CETIRIZINE HCL 10 MG
10 TABLET ORAL 2 TIMES DAILY
Qty: 30 TAB | Refills: 0 | Status: SHIPPED | OUTPATIENT
Start: 2017-08-09

## 2017-08-09 RX ORDER — PERMETHRIN 50 MG/G
CREAM TOPICAL
Qty: 60 G | Refills: 1 | Status: SHIPPED | OUTPATIENT
Start: 2017-08-09

## 2017-08-09 RX ORDER — ESCITALOPRAM OXALATE 20 MG/1
20 TABLET ORAL DAILY
COMMUNITY

## 2017-08-09 RX ORDER — HYDROXYZINE PAMOATE 25 MG/1
25 CAPSULE ORAL
Qty: 30 CAP | Refills: 0 | Status: SHIPPED | OUTPATIENT
Start: 2017-08-09

## 2017-08-09 RX ADMIN — HYDROXYZINE PAMOATE 25 MG: 25 CAPSULE ORAL at 03:50

## 2017-08-09 NOTE — ED NOTES
Patient discharged home, A&Ox4, no apparent distress. Patient verbalized understanding of discharge instructions  , medications and follow up.

## 2017-08-09 NOTE — ED TRIAGE NOTES
Pt states he had a rash to bilateral feet that started a month ago and is now spreading all over body, states he has sores that won't heal. Pt states his PCP gave him allergy pills but they are not helping.

## 2017-08-09 NOTE — ED PROVIDER NOTES
HPI Comments: Kris Marina is a 59 y.o. Male with h/o cad, with c/o worsening pruritic rash that started on lower legs, feet several days ago and now has progressed up legs to arms, chest, back. Saw pcp who prescribed zyrtec which has not helped. No fever, nvd, oral lesions. Itching is constant. No new meds, excessive bleeding. Is on plavix. No hematuria, gum bleeding    The history is provided by the patient. Past Medical History:   Diagnosis Date    CAD (coronary artery disease)     Chronic pain     GI bleed 11/2/2011    Hernia     HLD (hyperlipidemia)     HTN (hypertension)     Non compliance w medication regimen     S/P CABG x 3 01/16    LIMA to LAD, SVG to PDA and OM1    Tobacco abuse        Past Surgical History:   Procedure Laterality Date    HX HERNIA REPAIR Right     15 years    HX HERNIA REPAIR Left 2/10/2015    Left inguinal hernia repair         Family History:   Problem Relation Age of Onset    Diabetes Sister        Social History     Social History    Marital status:      Spouse name: N/A    Number of children: N/A    Years of education: N/A     Occupational History    Not on file. Social History Main Topics    Smoking status: Current Some Day Smoker     Packs/day: 0.50     Types: Cigarettes     Last attempt to quit: 1/16/2016    Smokeless tobacco: Never Used    Alcohol use No    Drug use: No    Sexual activity: Not Currently     Other Topics Concern    Not on file     Social History Narrative         ALLERGIES: Aspirin    Review of Systems   Constitutional: Negative for fever. HENT: Negative for sore throat. Eyes: Negative for redness and itching. Respiratory: Negative for cough and shortness of breath. Cardiovascular: Positive for leg swelling. Negative for chest pain. Gastrointestinal: Negative for blood in stool and diarrhea. Genitourinary: Negative for difficulty urinating. Musculoskeletal: Positive for arthralgias.    Skin: Positive for rash. Neurological: Negative for syncope. Hematological: Bruises/bleeds easily. Psychiatric/Behavioral: Positive for sleep disturbance. Vitals:    08/09/17 0249   BP: (!) 134/92   Pulse: 79   Resp: 18   Temp: 97.8 °F (36.6 °C)   SpO2: 100%            Physical Exam   Constitutional: He is oriented to person, place, and time. Non-toxic appearance. He does not appear ill. No distress. HENT:   Head: Normocephalic and atraumatic. Right Ear: External ear normal.   Left Ear: External ear normal.   Nose: Nose normal.   Mouth/Throat: Uvula is midline, oropharynx is clear and moist and mucous membranes are normal. No oral lesions. No oropharyngeal exudate. Eyes: Conjunctivae are normal.   Neck: Normal range of motion. Cardiovascular: Normal rate, regular rhythm, normal heart sounds and intact distal pulses. Pulmonary/Chest: Effort normal and breath sounds normal. No respiratory distress. Abdominal: Soft. There is no tenderness. Musculoskeletal: Normal range of motion. He exhibits edema (trace edema ankle). Neurological: He is alert and oriented to person, place, and time. Skin: Skin is warm and dry. Rash (multiple papular like lesions with some open. no petechiae. no obvious burrowing. no pustules) noted. He is not diaphoretic. Psychiatric: His behavior is normal.   Nursing note and vitals reviewed.        [facility]  ED Course       Procedures  Vitals:  Patient Vitals for the past 12 hrs:   Temp Pulse Resp BP SpO2   08/09/17 0249 97.8 °F (36.6 °C) 79 18 (!) 134/92 100 %         Medications ordered:   Medications   hydrOXYzine pamoate (VISTARIL) capsule 25 mg (25 mg Oral Given 8/9/17 0350)         Lab findings:  Recent Results (from the past 12 hour(s))   CBC WITH AUTOMATED DIFF    Collection Time: 08/09/17  3:35 AM   Result Value Ref Range    WBC 9.1 4.6 - 13.2 K/uL    RBC 4.76 4.70 - 5.50 M/uL    HGB 15.5 13.0 - 16.0 g/dL    HCT 44.7 36.0 - 48.0 %    MCV 93.9 74.0 - 97.0 FL    MCH 32.6 24.0 - 34.0 PG    MCHC 34.7 31.0 - 37.0 g/dL    RDW 13.3 11.6 - 14.5 %    PLATELET 565 785 - 115 K/uL    MPV 9.4 9.2 - 11.8 FL    NEUTROPHILS 55 40 - 73 %    LYMPHOCYTES 37 21 - 52 %    MONOCYTES 7 3 - 10 %    EOSINOPHILS 1 0 - 5 %    BASOPHILS 0 0 - 2 %    ABS. NEUTROPHILS 5.1 1.8 - 8.0 K/UL    ABS. LYMPHOCYTES 3.3 0.9 - 3.6 K/UL    ABS. MONOCYTES 0.6 0.05 - 1.2 K/UL    ABS. EOSINOPHILS 0.1 0.0 - 0.4 K/UL    ABS. BASOPHILS 0.0 0.0 - 0.06 K/UL    DF AUTOMATED     POC CHEM8    Collection Time: 08/09/17  3:36 AM   Result Value Ref Range    CO2, POC 23 19 - 24 MMOL/L    Glucose,  (H) 74 - 106 MG/DL    BUN, POC 12 7 - 18 MG/DL    Creatinine, POC 1.1 0.6 - 1.3 MG/DL    GFRAA, POC >60 >60 ml/min/1.73m2    GFRNA, POC >60 >60 ml/min/1.73m2    Sodium,  136 - 145 MMOL/L    Potassium, POC 3.8 3.5 - 5.5 MMOL/L    Calcium, ionized (POC) 1.20 1. 12 - 1.32 MMOL/L    Chloride,  100 - 108 MMOL/L    Anion gap, POC 18 10 - 20      Hematocrit, POC 46 36 - 49 %    Hemoglobin, POC 15.6 12 - 16 G/DL       EKG interpretation by ED Physician:      X-Ray, CT or other radiology findings or impressions:  No orders to display       Progress notes, Consult notes or additional Procedure notes:   Doubt infectious, need for abx. Possible scabies although not typical appearance but distribution similar  Will give trial elimite, oral anti-itch meds  I have discussed with patient and/or family/sig other the results, interpretation of any imaging if performed, suspected diagnosis and treatment plan to include instructions regarding the diagnoses listed to which understanding was expressed with all questions answered      Reevaluation of patient:   Stable for dc    Disposition:  Diagnosis:   1. Rash and other nonspecific skin eruption    2.  Pruritic dermatitis        Disposition: home      Follow-up Information     Follow up With Details Comments [address] [place] Schedule an appointment as soon as possible for a visit  [address] [facility]  [address] [address]  [address] [facility]   [address]  [address] [place]  [address]) [address] [facility]   [address]  [address]  [address] ([address]) [address] [facility] - [address] [facility] [phone]            Patient's Medications   Start Taking    HYDROXYZINE PAMOATE (VISTARIL) 25 MG CAPSULE    Take 1 Cap by mouth three (3) times daily as needed for Itching. PERMETHRIN (ELIMITE) 5 % TOPICAL CREAM    apply sparingly as directed  Apply to entire body and leave on for 10-12 hours; may repeat in 2 weeks   Continue Taking    ASPIRIN DELAYED-RELEASE 81 MG TABLET    Take 1 Tab by mouth daily. ATORVASTATIN (LIPITOR) 80 MG TABLET    TAKE TABLET BY MOUTH ONCE NIGHTLY    BUTALBITAL-ACETAMINOPHEN-CAFFEINE (FIORICET, ESGIC) -40 MG PER TABLET    Take 1 Tab by mouth two (2) times daily as needed for Pain. CLOPIDOGREL (PLAVIX) 75 MG TAB    Take 1 Tab by mouth daily. ESCITALOPRAM OXALATE (LEXAPRO) 20 MG TABLET    Take 20 mg by mouth daily. FUROSEMIDE (LASIX) 20 MG TABLET    Take  by mouth daily. GABAPENTIN (NEURONTIN) 300 MG CAPSULE    Take 1 capsule by mouth once in the morning and 2 capsules at bedtime for leg cramps. METOPROLOL TARTRATE (LOPRESSOR) 25 MG TABLET    Take 0.5 Tabs by mouth two (2) times a day. NITROGLYCERIN (NITROSTAT) 0.4 MG SL TABLET    1 Tab by SubLINGual route every five (5) minutes as needed for Chest Pain. RISPERIDONE (RISPERDAL) 2 MG TABLET    Take 2 mg by mouth. TOPIRAMATE (TOPAMAX) 50 MG TABLET    Take 50 mg by mouth two (2) times a day. Indications: MIGRAINE PREVENTION   These Medications have changed    Modified Medication Previous Medication    CETIRIZINE (ZYRTEC) 10 MG TABLET cetirizine (ZYRTEC) 10 mg tablet       Take 1 Tab by mouth two (2) times a day. Take 10 mg by mouth.    Stop Taking    ESCITALOPRAM OXALATE (LEXAPRO) 10 MG TABLET    Take 10 mg by mouth daily. FUROSEMIDE (LASIX) 20 MG TABLET    Take 1 Tab by mouth daily. PANTOPRAZOLE (PROTONIX) 40 MG TABLET    Take 40 mg by mouth daily. POTASSIUM CHLORIDE (K-DUR, KLOR-CON) 20 MEQ TABLET    Take 20 mEq by mouth daily. ZOLPIDEM (AMBIEN) 10 MG TABLET    Take  by mouth nightly as needed for Sleep.

## 2017-08-09 NOTE — ED NOTES
Patient came to the ER because he is covered in a blistering rash that started approximately 2 months ago.

## 2017-12-11 NOTE — COMMUNICATION BODY
[facility]    Mr. Barby Thomas is a 59 y.o. male with a history of coronary artery disease, status post three vessel CABG in January, 2016, hypertension, hyperlipidemia, tobacco abuse disorder. Mr. Barby Thomas is here today for follow up appointment. He tells me that he was taking aspirin, however he started having stomach upset and some spots of blood in the stool so he stopped taking aspirin just because he had a bleeding problem with aspirin in the past and stomach problem. He did not inform us. He is also not taking any of the medication, including his statin and other medication. He was given water pill by primary care provider, however he has not been taking that either. He has run out of it. He says he has been having lower extremity swelling with some rash after he had a swelling. He did not seek medical attention. He denies any new medications. He denies any chest pain or chest tightness. He denies any PND. He uses 1-2 pillows at night. He denies any presyncope or syncope. Past Medical History:   Diagnosis Date    CAD (coronary artery disease)     Chronic pain     GI bleed 11/2/2011    Hernia     HLD (hyperlipidemia)     HTN (hypertension)     Non compliance w medication regimen     S/P CABG x 3 01/16    LIMA to LAD, SVG to PDA and OM1    Tobacco abuse          Past Surgical History:   Procedure Laterality Date    HX HERNIA REPAIR Right     15 years    HX HERNIA REPAIR Left 2/10/2015    Left inguinal hernia repair       Current Outpatient Prescriptions   Medication Sig    furosemide (LASIX) 20 mg tablet Take  by mouth daily.  atorvastatin (LIPITOR) 80 mg tablet TAKE TABLET BY MOUTH ONCE NIGHTLY    aspirin delayed-release 81 mg tablet Take 1 Tab by mouth daily.  butalbital-acetaminophen-caffeine (FIORICET, ESGIC) -40 mg per tablet Take 1 Tab by mouth two (2) times daily as needed for Pain.     potassium chloride (K-DUR, KLOR-CON) 20 mEq tablet Take 20 mEq by mouth daily.  zolpidem (AMBIEN) 10 mg tablet Take  by mouth nightly as needed for Sleep.  nitroglycerin (NITROSTAT) 0.4 mg SL tablet 1 Tab by SubLINGual route every five (5) minutes as needed for Chest Pain.  topiramate (TOPAMAX) 50 mg tablet Take 50 mg by mouth two (2) times a day. Indications: MIGRAINE PREVENTION    pantoprazole (PROTONIX) 40 mg tablet Take 40 mg by mouth daily.  gabapentin (NEURONTIN) 300 mg capsule Take 1 capsule by mouth once in the morning and 2 capsules at bedtime for leg cramps.  escitalopram oxalate (LEXAPRO) 10 mg tablet Take 10 mg by mouth daily. No current facility-administered medications for this visit. Allergies and Sensitivities:  Allergies   Allergen Reactions    Aspirin Other (comments)     Stomach bleed       Family History:  Family History   Problem Relation Age of Onset    Diabetes Sister        Social History:  Social History   Substance Use Topics    Smoking status: Current Some Day Smoker     Packs/day: 0.50     Types: Cigarettes     Last attempt to quit: 1/16/2016    Smokeless tobacco: Never Used    Alcohol use No     He  reports that he has been smoking Cigarettes. He has been smoking about 0.50 packs per day. He has never used smokeless tobacco.  He  reports that he does not drink alcohol. Review of Systems:  Cardiac symptoms as noted above in HPI. All others negative. Denies skin rash, blurring vision, photophobia, neck pain, hemoptysis, chronic cough, nausea, vomiting, hematuria, burning micturition, BRBPR, chronic headaches.     Physical Exam:  BP Readings from Last 3 Encounters:   07/27/17 (!) 150/95   01/19/17 98/67   07/28/16 114/68         Pulse Readings from Last 3 Encounters:   07/27/17 68   01/19/17 68   07/28/16 (!) 55          Wt Readings from Last 3 Encounters:   07/27/17 173 lb (78.5 kg)   01/19/17 166 lb (75.3 kg)   07/28/16 164 lb (74.4 kg)       Constitutional: Oriented to person, place, and time. HENT: Head: Normocephalic and atraumatic. Neck: No JVD present. Cardiovascular: Regular rhythm. No murmur, gallop or rubs appreciated  Lung: Breath sounds normal. No respiratory distress. No ronchi or rales appreciated  Abdominal: No tenderness. No rebound and no guarding. Musculoskeletal: There is no lower extremity edema. No cynosis    Review of Data  LABS:   Lab Results   Component Value Date/Time    Sodium 136 01/23/2016 03:40 AM    Potassium 3.3 01/23/2016 03:40 AM    Chloride 101 01/23/2016 03:40 AM    CO2 28 01/23/2016 03:40 AM    Glucose 111 01/23/2016 03:40 AM    BUN 18 01/23/2016 03:40 AM    Creatinine 1.00 01/23/2016 03:40 AM     Lipids Latest Ref Rng & Units 1/13/2016   Chol, Total <200 MG/(H)   HDL 40 - 60 MG/DL 37(L)   LDL 0 - 100 MG/(H)   Trig <150 MG/(H)   Chol/HDL Ratio 0 - 5.0   5.5(H)   Some recent data might be hidden     Lab Results   Component Value Date/Time    ALT (SGPT) 26 01/11/2016 10:50 PM     Lab Results   Component Value Date/Time    Hemoglobin A1c 5.9 01/15/2016 01:55 PM       EKG    ECHO 901/16)  Left ventricle: Size was normal. Systolic function was normal. Ejection  fraction was estimated in the range of 55 % to 60 %. There were no  regional wall motion abnormalities. Wall thickness was normal.  Right ventricle: The size was normal.  Left atrium: Size was normal.  Right atrium: Size was normal.  Mitral valve: There was mild regurgitation. Aortic valve: There was no stenosis. There was no regurgitation. Tricuspid valve: There was mild regurgitation. CATHETERIZATION (01/16)   Left ventricle end-diastolic pressure was 17-20 mmHg. No evidence of aortic stenosis. Estimated ejection fraction on manual injection was 55%. No significant wall motion abnormality. LM: Angiographically normal.   LAD: Proximal mid 50-70% calcified haziness. Mid LAD had another 70% calcified tubular haziness lesion with KAROL 2 flow.    D1: Ostial 50%, small to medium caliber vessel. LCX/OM: Proximal normal. OM1 has 90% disease at the bifurcation. Distally bifurcates without any significant obstructive disease. OM2: Small to medium caliber vessel with evidence of proximal long disease up to 70%. RCA: Dominant, proximal and mid RCA has diffuse disease with 2 or 3 lesions up to 95-99%. Distal RCA has 30-40% luminal irregularities,  otherwise no significant obstructive disease. IMPRESSION & PLAN:  Mr. Claudetta Pinna is a 59 y.o. male with a history of coronary artery disease, hypertension, hyperlipidemia and tobacco abuse disorder. Coronary artery disease:  Ms. Claudetta Pinna has a three vessel CABG in January, 2016. No angina currently  Stopped ASA because of stomach discomfort and ?? Possible blood in stool. Now resolved. Not on BB  Stopped statin  Will ask to take plavix 75 mg daily. Stop ASA. Ask him to see GI physician to evaluate PUD and for blood in stool. Chest pain with coughing at surgical site which is reproducible. Likely musculoskeletal in nature. Hypertension:  Blood pressure is high today. Will restart BB metoprolol 12.5 mg BID. Hyperlipidemia:  He is on Atorvastatin 80 mg daily. Has stopped taking it. Will restart    Tobacco abuse disorder:   He used to smoke, however he has not smoked since his CABG surgery. Only one ciggarate here and there. Edema: Pitting LE edema. Start lasix 20 mg daily. Advised to take banana a day. ECHO to eval EF in setting of edema and CAD  No rales or JVD on exam    He will follow up with PCP for his new LE rash. No new meds or not itchy. Importance of diet and exercise was discussed with patient. This plan was discussed with patient who is in agreement. Thank you for allowing me to participate in patient care. Please feel free to call me if you have any question or concern. Emilia Torres MD  Please note: This document has been produced using voice recognition software.  Unrecognized errors in transcription may be present.

## 2018-02-27 RX ORDER — CLOPIDOGREL BISULFATE 75 MG/1
TABLET ORAL
Qty: 30 TAB | Refills: 6 | Status: SHIPPED | OUTPATIENT
Start: 2018-02-27 | End: 2018-10-13 | Stop reason: SDUPTHER

## 2018-03-27 ENCOUNTER — APPOINTMENT (OUTPATIENT)
Dept: GENERAL RADIOLOGY | Age: 58
End: 2018-03-27
Attending: PHYSICIAN ASSISTANT
Payer: MEDICAID

## 2018-03-27 ENCOUNTER — HOSPITAL ENCOUNTER (OUTPATIENT)
Age: 58
Setting detail: OBSERVATION
Discharge: HOME | End: 2018-03-29
Attending: EMERGENCY MEDICINE | Admitting: HOSPITALIST
Payer: MEDICAID

## 2018-03-27 ENCOUNTER — APPOINTMENT (OUTPATIENT)
Dept: GENERAL RADIOLOGY | Age: 58
End: 2018-03-27
Attending: EMERGENCY MEDICINE
Payer: MEDICAID

## 2018-03-27 ENCOUNTER — APPOINTMENT (OUTPATIENT)
Dept: CT IMAGING | Age: 58
End: 2018-03-27
Attending: PHYSICIAN ASSISTANT
Payer: MEDICAID

## 2018-03-27 DIAGNOSIS — R53.1 LEFT-SIDED WEAKNESS: ICD-10-CM

## 2018-03-27 DIAGNOSIS — R51.9 NONINTRACTABLE HEADACHE, UNSPECIFIED CHRONICITY PATTERN, UNSPECIFIED HEADACHE TYPE: Primary | ICD-10-CM

## 2018-03-27 DIAGNOSIS — R20.0 LEFT SIDED NUMBNESS: ICD-10-CM

## 2018-03-27 DIAGNOSIS — R07.9 CHEST PAIN, UNSPECIFIED TYPE: ICD-10-CM

## 2018-03-27 PROBLEM — G45.9 TIA (TRANSIENT ISCHEMIC ATTACK): Status: ACTIVE | Noted: 2018-03-27

## 2018-03-27 LAB
ABO + RH BLD: NORMAL
ALBUMIN SERPL-MCNC: 3.7 G/DL (ref 3.4–5)
ALBUMIN/GLOB SERPL: 1.1 {RATIO} (ref 0.8–1.7)
ALP SERPL-CCNC: 98 U/L (ref 45–117)
ALT SERPL-CCNC: 19 U/L (ref 16–61)
AMPHET UR QL SCN: NEGATIVE
ANION GAP SERPL CALC-SCNC: 4 MMOL/L (ref 3–18)
APPEARANCE UR: CLEAR
ASPIRIN TEST, ASPIRN: 556 ARU (ref 620–672)
AST SERPL-CCNC: 12 U/L (ref 15–37)
ATRIAL RATE: 51 BPM
BARBITURATES UR QL SCN: NEGATIVE
BASOPHILS # BLD: 0 K/UL (ref 0–0.06)
BASOPHILS NFR BLD: 0 % (ref 0–2)
BENZODIAZ UR QL: NEGATIVE
BILIRUB SERPL-MCNC: 0.4 MG/DL (ref 0.2–1)
BILIRUB UR QL: NEGATIVE
BLOOD GROUP ANTIBODIES SERPL: NORMAL
BUN SERPL-MCNC: 16 MG/DL (ref 7–18)
BUN/CREAT SERPL: 15 (ref 12–20)
CALCIUM SERPL-MCNC: 8.7 MG/DL (ref 8.5–10.1)
CALCULATED P AXIS, ECG09: 52 DEGREES
CALCULATED R AXIS, ECG10: 52 DEGREES
CALCULATED T AXIS, ECG11: 48 DEGREES
CANNABINOIDS UR QL SCN: POSITIVE
CHLORIDE SERPL-SCNC: 108 MMOL/L (ref 100–108)
CHOLEST SERPL-MCNC: 220 MG/DL
CK MB CFR SERPL CALC: 1.8 % (ref 0–4)
CK MB CFR SERPL CALC: 2 % (ref 0–4)
CK MB SERPL-MCNC: 1.2 NG/ML (ref 5–25)
CK MB SERPL-MCNC: 1.8 NG/ML (ref 5–25)
CK SERPL-CCNC: 68 U/L (ref 39–308)
CK SERPL-CCNC: 88 U/L (ref 39–308)
CO2 SERPL-SCNC: 27 MMOL/L (ref 21–32)
COCAINE UR QL SCN: NEGATIVE
COLOR UR: YELLOW
CREAT SERPL-MCNC: 1.04 MG/DL (ref 0.6–1.3)
DIAGNOSIS, 93000: NORMAL
DIFFERENTIAL METHOD BLD: ABNORMAL
EOSINOPHIL # BLD: 0 K/UL (ref 0–0.4)
EOSINOPHIL NFR BLD: 1 % (ref 0–5)
ERYTHROCYTE [DISTWIDTH] IN BLOOD BY AUTOMATED COUNT: 13.1 % (ref 11.6–14.5)
FIBRINOGEN PPP-MCNC: 453 MG/DL (ref 210–451)
GLOBULIN SER CALC-MCNC: 3.3 G/DL (ref 2–4)
GLUCOSE BLD STRIP.AUTO-MCNC: 154 MG/DL (ref 70–110)
GLUCOSE BLD STRIP.AUTO-MCNC: 80 MG/DL (ref 70–110)
GLUCOSE BLD STRIP.AUTO-MCNC: 91 MG/DL (ref 70–110)
GLUCOSE SERPL-MCNC: 86 MG/DL (ref 74–99)
GLUCOSE UR STRIP.AUTO-MCNC: NEGATIVE MG/DL
HCT VFR BLD AUTO: 51 % (ref 36–48)
HDLC SERPL-MCNC: 38 MG/DL (ref 40–60)
HDLC SERPL: 5.8 {RATIO} (ref 0–5)
HDSCOM,HDSCOM: ABNORMAL
HGB BLD-MCNC: 17.2 G/DL (ref 13–16)
HGB UR QL STRIP: NEGATIVE
INR PPP: 1 (ref 0.8–1.2)
KETONES UR QL STRIP.AUTO: NEGATIVE MG/DL
LDLC SERPL CALC-MCNC: 151.2 MG/DL (ref 0–100)
LEUKOCYTE ESTERASE UR QL STRIP.AUTO: NEGATIVE
LIPID PROFILE,FLP: ABNORMAL
LYMPHOCYTES # BLD: 3.6 K/UL (ref 0.9–3.6)
LYMPHOCYTES NFR BLD: 44 % (ref 21–52)
MAGNESIUM SERPL-MCNC: 2.4 MG/DL (ref 1.6–2.6)
MCH RBC QN AUTO: 32.2 PG (ref 24–34)
MCHC RBC AUTO-ENTMCNC: 33.7 G/DL (ref 31–37)
MCV RBC AUTO: 95.5 FL (ref 74–97)
METHADONE UR QL: NEGATIVE
MONOCYTES # BLD: 0.5 K/UL (ref 0.05–1.2)
MONOCYTES NFR BLD: 7 % (ref 3–10)
NEUTS SEG # BLD: 4.1 K/UL (ref 1.8–8)
NEUTS SEG NFR BLD: 48 % (ref 40–73)
NITRITE UR QL STRIP.AUTO: NEGATIVE
OPIATES UR QL: NEGATIVE
P-R INTERVAL, ECG05: 116 MS
PCP UR QL: NEGATIVE
PH UR STRIP: 5 [PH] (ref 5–8)
PLATELET # BLD AUTO: 318 K/UL (ref 135–420)
PMV BLD AUTO: 10.4 FL (ref 9.2–11.8)
POTASSIUM SERPL-SCNC: 4.4 MMOL/L (ref 3.5–5.5)
PROT SERPL-MCNC: 7 G/DL (ref 6.4–8.2)
PROT UR STRIP-MCNC: NEGATIVE MG/DL
PROTHROMBIN TIME: 12.8 SEC (ref 11.5–15.2)
Q-T INTERVAL, ECG07: 410 MS
QRS DURATION, ECG06: 66 MS
QTC CALCULATION (BEZET), ECG08: 377 MS
RBC # BLD AUTO: 5.34 M/UL (ref 4.7–5.5)
SODIUM SERPL-SCNC: 139 MMOL/L (ref 136–145)
SP GR UR REFRACTOMETRY: 1.02 (ref 1–1.03)
SPECIMEN EXP DATE BLD: NORMAL
THROMBIN TIME: 16 SECS (ref 13.8–18.2)
TRIGL SERPL-MCNC: 154 MG/DL (ref ?–150)
TROPONIN I SERPL-MCNC: <0.02 NG/ML (ref 0–0.04)
TROPONIN I SERPL-MCNC: <0.02 NG/ML (ref 0–0.04)
TSH SERPL DL<=0.05 MIU/L-ACNC: 1.33 UIU/ML (ref 0.36–3.74)
UROBILINOGEN UR QL STRIP.AUTO: 0.2 EU/DL (ref 0.2–1)
VENTRICULAR RATE, ECG03: 51 BPM
VLDLC SERPL CALC-MCNC: 30.8 MG/DL
WBC # BLD AUTO: 8.3 K/UL (ref 4.6–13.2)

## 2018-03-27 PROCEDURE — 85610 PROTHROMBIN TIME: CPT | Performed by: PHYSICIAN ASSISTANT

## 2018-03-27 PROCEDURE — 93005 ELECTROCARDIOGRAM TRACING: CPT

## 2018-03-27 PROCEDURE — 77030020263 HC SOL INJ SOD CL0.9% LFCR 1000ML

## 2018-03-27 PROCEDURE — 74011250637 HC RX REV CODE- 250/637: Performed by: FAMILY MEDICINE

## 2018-03-27 PROCEDURE — 86900 BLOOD TYPING SEROLOGIC ABO: CPT | Performed by: EMERGENCY MEDICINE

## 2018-03-27 PROCEDURE — 82962 GLUCOSE BLOOD TEST: CPT

## 2018-03-27 PROCEDURE — 74011250636 HC RX REV CODE- 250/636: Performed by: INTERNAL MEDICINE

## 2018-03-27 PROCEDURE — 99218 HC RM OBSERVATION: CPT

## 2018-03-27 PROCEDURE — 81003 URINALYSIS AUTO W/O SCOPE: CPT | Performed by: EMERGENCY MEDICINE

## 2018-03-27 PROCEDURE — 96372 THER/PROPH/DIAG INJ SC/IM: CPT

## 2018-03-27 PROCEDURE — 85025 COMPLETE CBC W/AUTO DIFF WBC: CPT | Performed by: PHYSICIAN ASSISTANT

## 2018-03-27 PROCEDURE — 71045 X-RAY EXAM CHEST 1 VIEW: CPT

## 2018-03-27 PROCEDURE — 99285 EMERGENCY DEPT VISIT HI MDM: CPT

## 2018-03-27 PROCEDURE — 80307 DRUG TEST PRSMV CHEM ANLYZR: CPT | Performed by: EMERGENCY MEDICINE

## 2018-03-27 PROCEDURE — 80061 LIPID PANEL: CPT | Performed by: INTERNAL MEDICINE

## 2018-03-27 PROCEDURE — 84484 ASSAY OF TROPONIN QUANT: CPT | Performed by: PHYSICIAN ASSISTANT

## 2018-03-27 PROCEDURE — 85576 BLOOD PLATELET AGGREGATION: CPT | Performed by: PHYSICIAN ASSISTANT

## 2018-03-27 PROCEDURE — 70450 CT HEAD/BRAIN W/O DYE: CPT

## 2018-03-27 PROCEDURE — 85384 FIBRINOGEN ACTIVITY: CPT | Performed by: PHYSICIAN ASSISTANT

## 2018-03-27 PROCEDURE — 84443 ASSAY THYROID STIM HORMONE: CPT | Performed by: INTERNAL MEDICINE

## 2018-03-27 PROCEDURE — 36415 COLL VENOUS BLD VENIPUNCTURE: CPT | Performed by: FAMILY MEDICINE

## 2018-03-27 PROCEDURE — 74011250637 HC RX REV CODE- 250/637: Performed by: INTERNAL MEDICINE

## 2018-03-27 PROCEDURE — 83735 ASSAY OF MAGNESIUM: CPT | Performed by: PHYSICIAN ASSISTANT

## 2018-03-27 PROCEDURE — 85670 THROMBIN TIME PLASMA: CPT | Performed by: PHYSICIAN ASSISTANT

## 2018-03-27 PROCEDURE — 80053 COMPREHEN METABOLIC PANEL: CPT | Performed by: PHYSICIAN ASSISTANT

## 2018-03-27 PROCEDURE — 74011250637 HC RX REV CODE- 250/637

## 2018-03-27 RX ORDER — FUROSEMIDE 20 MG/1
20 TABLET ORAL DAILY
Status: DISCONTINUED | OUTPATIENT
Start: 2018-03-28 | End: 2018-03-29 | Stop reason: HOSPADM

## 2018-03-27 RX ORDER — RISPERIDONE 1 MG/1
2 TABLET, FILM COATED ORAL
Status: DISCONTINUED | OUTPATIENT
Start: 2018-03-27 | End: 2018-03-29 | Stop reason: HOSPADM

## 2018-03-27 RX ORDER — HEPARIN SODIUM 5000 [USP'U]/ML
5000 INJECTION, SOLUTION INTRAVENOUS; SUBCUTANEOUS EVERY 8 HOURS
Status: DISCONTINUED | OUTPATIENT
Start: 2018-03-27 | End: 2018-03-29 | Stop reason: HOSPADM

## 2018-03-27 RX ORDER — CETIRIZINE HCL 10 MG
10 TABLET ORAL 2 TIMES DAILY
Status: DISCONTINUED | OUTPATIENT
Start: 2018-03-27 | End: 2018-03-29 | Stop reason: HOSPADM

## 2018-03-27 RX ORDER — ATORVASTATIN CALCIUM 40 MG/1
80 TABLET, FILM COATED ORAL
Status: DISCONTINUED | OUTPATIENT
Start: 2018-03-27 | End: 2018-03-29 | Stop reason: HOSPADM

## 2018-03-27 RX ORDER — IBUPROFEN 200 MG
TABLET ORAL
Status: COMPLETED
Start: 2018-03-27 | End: 2018-03-27

## 2018-03-27 RX ORDER — AMOXICILLIN 250 MG
2 CAPSULE ORAL
Status: DISCONTINUED | OUTPATIENT
Start: 2018-03-27 | End: 2018-03-29 | Stop reason: HOSPADM

## 2018-03-27 RX ORDER — METOPROLOL TARTRATE 25 MG/1
12.5 TABLET, FILM COATED ORAL 2 TIMES DAILY
Status: DISCONTINUED | OUTPATIENT
Start: 2018-03-27 | End: 2018-03-29 | Stop reason: HOSPADM

## 2018-03-27 RX ORDER — ALBUTEROL SULFATE 0.83 MG/ML
2.5 SOLUTION RESPIRATORY (INHALATION)
Status: DISCONTINUED | OUTPATIENT
Start: 2018-03-27 | End: 2018-03-29 | Stop reason: HOSPADM

## 2018-03-27 RX ORDER — ONDANSETRON 2 MG/ML
2 INJECTION INTRAMUSCULAR; INTRAVENOUS
Status: DISCONTINUED | OUTPATIENT
Start: 2018-03-27 | End: 2018-03-29 | Stop reason: HOSPADM

## 2018-03-27 RX ORDER — IBUPROFEN 200 MG
1 TABLET ORAL DAILY
Status: DISCONTINUED | OUTPATIENT
Start: 2018-03-28 | End: 2018-03-29 | Stop reason: HOSPADM

## 2018-03-27 RX ORDER — ESCITALOPRAM OXALATE 20 MG/1
20 TABLET ORAL DAILY
Status: DISCONTINUED | OUTPATIENT
Start: 2018-03-28 | End: 2018-03-29 | Stop reason: HOSPADM

## 2018-03-27 RX ORDER — CLOPIDOGREL BISULFATE 75 MG/1
75 TABLET ORAL DAILY
Status: DISCONTINUED | OUTPATIENT
Start: 2018-03-28 | End: 2018-03-29 | Stop reason: HOSPADM

## 2018-03-27 RX ORDER — CLOPIDOGREL BISULFATE 75 MG/1
75 TABLET ORAL
Status: DISCONTINUED | OUTPATIENT
Start: 2018-03-27 | End: 2018-03-27

## 2018-03-27 RX ORDER — NITROGLYCERIN 0.4 MG/1
0.4 TABLET SUBLINGUAL AS NEEDED
Status: DISCONTINUED | OUTPATIENT
Start: 2018-03-27 | End: 2018-03-29 | Stop reason: HOSPADM

## 2018-03-27 RX ORDER — ACETAMINOPHEN 325 MG/1
650 TABLET ORAL
Status: DISCONTINUED | OUTPATIENT
Start: 2018-03-27 | End: 2018-03-29 | Stop reason: HOSPADM

## 2018-03-27 RX ORDER — ASPIRIN 81 MG/1
81 TABLET ORAL DAILY
Status: DISCONTINUED | OUTPATIENT
Start: 2018-03-28 | End: 2018-03-29 | Stop reason: HOSPADM

## 2018-03-27 RX ORDER — GABAPENTIN 300 MG/1
300 CAPSULE ORAL 2 TIMES DAILY
Status: DISCONTINUED | OUTPATIENT
Start: 2018-03-27 | End: 2018-03-29 | Stop reason: HOSPADM

## 2018-03-27 RX ADMIN — ACETAMINOPHEN 650 MG: 325 TABLET, FILM COATED ORAL at 23:10

## 2018-03-27 RX ADMIN — GABAPENTIN 300 MG: 300 CAPSULE ORAL at 21:28

## 2018-03-27 RX ADMIN — RISPERIDONE 2 MG: 1 TABLET ORAL at 21:29

## 2018-03-27 RX ADMIN — ATORVASTATIN CALCIUM 80 MG: 40 TABLET, FILM COATED ORAL at 21:28

## 2018-03-27 RX ADMIN — CETIRIZINE HYDROCHLORIDE 10 MG: 10 TABLET, FILM COATED ORAL at 21:29

## 2018-03-27 RX ADMIN — HEPARIN SODIUM 5000 UNITS: 5000 INJECTION, SOLUTION INTRAVENOUS; SUBCUTANEOUS at 21:29

## 2018-03-27 RX ADMIN — METOPROLOL TARTRATE 12.5 MG: 25 TABLET ORAL at 21:29

## 2018-03-27 RX ADMIN — NITROGLYCERIN 0.4 MG: 0.4 TABLET SUBLINGUAL at 21:27

## 2018-03-27 NOTE — PROGRESS NOTES
Pt received to unit via stretcher from ED accompanied by Martha Welsh RN to room 2117. This nurse to bedside, and Patient ambulating from bathroom to bed; gait steady. He is A&Ox4. No s/s of distress noted. Dual NIH completed with Martha Welsh RN; see screening for results. Bed locked in low position; call bell in reach. [phone]- Dr. Apolonia Laughlin return call that was placed. Informed of Patient location; Patient passed Dysphagia screening and heart sinus [id number]. Received order for Cardiac diet (RBV).

## 2018-03-27 NOTE — ROUTINE PROCESS
Bedside and Verbal shift change report given to GUERLINE NIEVES RN (oncoming nurse) by Nita Shoemaker RN, BSN (offgoing nurse). Report given with SBAR, Kardex, Intake/Output, MAR and Recent Results.

## 2018-03-27 NOTE — IP AVS SNAPSHOT
Mattiedanis Rodriguez 
 
 
 [address] [place] [address] [id number] [phone] Patient: Fannie Palumbo [facility] MRN: [id number] VJT:[id number] About your hospitalization You were admitted on:  March 27, 2018 You last received care in the:  [address] 2 [facility] You were discharged on:  March 29, 2018 Why you were hospitalized Your primary diagnosis was:  Tia (Transient Ischemic Attack) Your diagnoses also included:  Migraine Headache Without Aura Follow-up Information Follow up With Details Comments Contact Info Prasanna Cedillo MD Schedule an appointment as soon as possible for a visit in 1 week follow up [facility] [id number] 
[phone] Lennox Leatherwood, MD In 2 weeks  300 [facility] 83 [id number] 
[phone] Discharge Orders None A check jose indicates which time of day the medication should be taken. My Medications START taking these medications Instructions Each Dose to Equal  
 Morning Noon Evening Bedtime  
 folic acid 1 mg tablet Commonly known as:  Google Your last dose was: Your next dose is: Take 1 Tab by mouth daily. 1 mg  
    
   
   
   
  
 nicotine 14 mg/24 hr patch Commonly known as:  Kiesha Ruby Start taking on:  3/30/2018 Your last dose was: Your next dose is:    
   
   
 1 Patch by TransDERmal route daily for 30 days. 1 Patch  
    
   
   
   
  
 senna-docusate 8.6-50 mg per tablet Commonly known as:  Amanuel Ramos Your last dose was: Your next dose is: Take 2 Tabs by mouth nightly. 2 Tab Thiamine Mononitrate 100 mg tablet Commonly known as:  B-1 Your last dose was: Your next dose is: Take 1 Tab by mouth daily. 100 mg CONTINUE taking these medications  Instructions Each Dose to Equal  
 Morning Noon Evening Bedtime  
 aspirin delayed-release 81 mg tablet Your last dose was: Your next dose is: Take 1 Tab by mouth daily. 81 mg  
    
   
   
   
  
 atorvastatin 80 mg tablet Commonly known as:  LIPITOR Your last dose was: Your next dose is: TAKE TABLET BY MOUTH ONCE NIGHTLY  
     
   
   
   
  
 butalbital-acetaminophen-caffeine -40 mg per tablet Commonly known as:  Michelle Host Your last dose was: Your next dose is: Take 1 Tab by mouth two (2) times daily as needed for Pain. 1 Tab  
    
   
   
   
  
 cetirizine 10 mg tablet Commonly known as:  ZYRTEC Your last dose was: Your next dose is: Take 1 Tab by mouth two (2) times a day. 10 mg  
    
   
   
   
  
 clopidogrel 75 mg Tab Commonly known as:  PLAVIX Your last dose was: Your next dose is: TAKE ONE TABLET BY MOUTH ONCE DAILY  
     
   
   
   
  
 escitalopram oxalate 20 mg tablet Commonly known as:  Richard Redmond Your last dose was: Your next dose is: Take 20 mg by mouth daily. 20 mg  
    
   
   
   
  
 furosemide 20 mg tablet Commonly known as:  LASIX Your last dose was: Your next dose is: Take  by mouth daily. gabapentin 300 mg capsule Commonly known as:  NEURONTIN Your last dose was: Your next dose is: Take 1 capsule by mouth once in the morning and 2 capsules at bedtime for leg cramps. hydrOXYzine pamoate 25 mg capsule Commonly known as:  VISTARIL Your last dose was: Your next dose is: Take 1 Cap by mouth three (3) times daily as needed for Itching. 25 mg  
    
   
   
   
  
 metoprolol tartrate 25 mg tablet Commonly known as:  LOPRESSOR Your last dose was: Your next dose is: Take 0.5 Tabs by mouth two (2) times a day. 12.5 mg  
    
   
   
   
  
 nitroglycerin 0.4 mg SL tablet Commonly known as:  NITROSTAT Your last dose was: Your next dose is:    
   
   
 1 Tab by SubLINGual route every five (5) minutes as needed for Chest Pain. 0.4 mg  
    
   
   
   
  
 permethrin 5 % topical cream  
Commonly known as:  Cary Dangelo Your last dose was: Your next dose is:    
   
   
 apply sparingly as directed Apply to entire body and leave on for 10-12 hours; may repeat in 2 weeks  
     
   
   
   
  
 risperiDONE 2 mg tablet Commonly known as:  RisperDAL Your last dose was: Your next dose is: Take 2 mg by mouth. 2 mg  
    
   
   
   
  
 topiramate 50 mg tablet Commonly known as:  TOPAMAX Your last dose was: Your next dose is: Take 50 mg by mouth two (2) times a day. Indications: MIGRAINE PREVENTION 50 mg Where to Get Your Medications Information on where to get these meds will be given to you by the nurse or doctor. ! Ask your nurse or doctor about these medications  
  folic acid 1 mg tablet  
 nicotine 14 mg/24 hr patch  
 senna-docusate 8.6-50 mg per tablet Thiamine Mononitrate 100 mg tablet Discharge Instructions DISCHARGE SUMMARY from Nurse PATIENT INSTRUCTIONS: 
 
 
F-face looks uneven A-arms unable to move or move unevenly S-speech slurred or non-existent T-time-call 911 as soon as signs and symptoms begin-DO NOT go Back to bed or wait to see if you get better-TIME IS BRAIN. Warning Signs of HEART ATTACK Call 911 if you have these symptoms: 
? Chest discomfort. Most heart attacks involve discomfort in the center of the chest that lasts more than a few minutes, or that goes away and comes back. It can feel like uncomfortable pressure, squeezing, fullness, or pain. ? Discomfort in other areas of the upper body. Symptoms can include pain or discomfort in one or both arms, the back, neck, jaw, or stomach. ? Shortness of breath with or without chest discomfort. ? Other signs may include breaking out in a cold sweat, nausea, or lightheadedness. Don't wait more than five minutes to call [address]! Fast action can save your life. Calling 911 is almost always the fastest way to get lifesaving treatment. Emergency Medical Services staff can begin treatment when they arrive  up to an hour sooner than if someone gets to the hospital by car. The discharge information has been reviewed with the patient. The patient verbalized understanding. Discharge medications reviewed with the patient and appropriate educational materials and side effects teaching were provided. Patient armband removed and shredded Patient given [facility] Stroke Education Binder, Stroke Handouts or Verbal Education. ___________________________________________________________________________________________________________________________________ [employer] Announcement We are excited to announce that we are making your provider's discharge notes available to you in [employer]. You will see these notes when they are completed and signed by the physician that discharged you from your recent hospital stay. If you have any questions or concerns about any information you see in [employer], please call the Health Information Department where you were seen or reach out to your Primary Care Provider for more information about your plan of care. Introducing [facility]! Marquessari Sahu introduces [employer] patient portal. Now you can access parts of your medical record, email your doctor's office, and request medication refills online. 1. In your internet browser, go to [email or web address] 2. Click on the First Time User? Click Here link in the Sign In box. You will see the New Member Sign Up page. 3. Enter your [employer] Access Code exactly as it appears below. You will not need to use this code after youve completed the sign-up process. If you do not sign up before the expiration date, you must request a new code. · [employer] Access Code: [email or web address] Expires: 6/27/2018 10:22 AM 
 
4. Enter the last four digits of your Social Security Number (xxxx) and Date of Birth (mm/dd/yyyy) as indicated and click Submit. You will be taken to the next sign-up page. 5. Create a TripGems ID. This will be your TripGems login ID and cannot be changed, so think of one that is secure and easy to remember. 6. Create a [employer] password. You can change your password at any time. 7. Enter your Password Reset Question and Answer. This can be used at a later time if you forget your password. 8. Enter your e-mail address. You will receive e-mail notification when new information is available in [address] 9. Click Sign Up. You can now view and download portions of your medical record. 10. Click the Download Summary menu link to download a portable copy of your medical information. If you have questions, please visit the Frequently Asked Questions section of the [employer]ems website. Remember, [employer] is NOT to be used for urgent needs. For medical emergencies, dial 911. Now available from your iPhone and Android! Introducing Esvin Serrano As a Rosina Sahu patient, I wanted to make you aware of our electronic visit tool called Esvin Serrano. Marquessari DaleySahu 24/7 allows you to connect within minutes with a medical provider 24 hours a day, seven days a week via a mobile device or tablet or logging into a secure website from your computer. You can access [employer] from anywhere in the [place]. A virtual visit might be right for you when you have a simple condition and feel like you just dont want to get out of bed, or cant get away from work for an appointment, when your regular Deneise Broach provider is not available (evenings, weekends or holidays), or when youre out of town and need minor care. Electronic visits cost only $49 and if the Wilfredeise Broach 24/7 provider determines a prescription is needed to treat your condition, one can be electronically transmitted to a nearby pharmacy*. Please take a moment to enroll today if you have not already done so. The enrollment process is free and takes just a few minutes. To enroll, please download the [employer] deneen to your tablet or phone, or visit [email or web address] to enroll on your computer. And, as an [address] [place] [address] patient with a [facility] account, the results of your visits will be scanned into your electronic medical record and your primary care provider will be able to view the scanned results. We urge you to continue to see your regular Deneise Broach provider for your ongoing medical care. And while your primary care provider may not be the one available when you seek a Esvin Earlekeren virtual visit, the peace of mind you get from getting a real diagnosis real time can be priceless. For more information on Esvin [email or web address].comdustinfin, view our Frequently Asked Questions (FAQs) at [email or web address]. Sincerely, 
 
Vinh Boykin MD 
Chief Medical Officer Alannah Kan *:  certain medications cannot be prescribed via Esvin Jiemai.comkeren Providers Seen During Your Hospitalization Provider Specialty Primary office phone Faheem Suarez MD Emergency Medicine [phone] Annmarie Mack MD Internal Medicine [phone] Nickolas Bain MD Family Practice [phone] Your Primary Care Physician (PCP) Primary Care Physician Office Phone Office Fax Vasile Kay [phone] You are allergic to the following Allergen Reactions Aspirin Other (comments) Stomach bleed Recent Documentation Height Weight BMI Smoking Status 1.549 m 65.8 kg 27.4 kg/m2 Current Some Day Smoker Emergency Contacts Name Discharge Info Relation Home Work Mobile [address] CAREGIVER [3] Mother [14]   [phone] Fabio Lamar DISCHARGE CAREGIVER [3] Brother [24]   [phone] Patient Belongings The following personal items are in your possession at time of discharge: 
  Dental Appliances: None  Visual Aid: None      Home Medications: Sent to pharmacy   Jewelry: None  Clothing: Belt, Footwear, Jacket/Coat, Pants, At bedside, Socks, Undergarments, Shirt    Other Valuables: Cell Phone, With patient Discharge Instructions Attachments/References TRANSIENT ISCHEMIC ATTACK: GENERAL INFO (ENGLISH) Patient Handouts Learning About Transient Ischemic Attack (TIA) What is a TIA? A transient ischemic attack (TIA) means that the blood flow to a part of the brain is blocked for a short time. A TIA feels like a stroke but usually lasts only 10 to 20 minutes. Unlike a stroke, a TIA does not cause lasting brain damage. A TIA is usually caused by a blood clot that blocks blood flow in the brain. A blood clot can form in another part of the body (often the heart) and travel through the bloodstream to the brain. When blood flow to part of the brain is blocked, the brain cells in that area are affected within seconds. This causes symptoms in parts of the body controlled by those brain cells. When the blood clot dissolves, blood flow returns, and the symptoms go away. Blood clots can be the result of hardening of the arteries (atherosclerosis) or a heart attack. Sometimes a TIA is caused by a sharp drop in blood pressure that reduces blood flow to the brain. This is called a \"low-flow\" TIA. It is not as common as a TIA caused by a blood clot. What happens after a TIA? TIA is a serious warning sign of a possible stroke in the future. If you have other medical conditions such as coronary artery disease or atherosclerosis, you may also have an increased risk for a heart attack. Talk to your doctor about your risk. Understanding your risk will help you and your doctor plan your treatment options. You can do a lot to lower your chance of having another TIA or a stroke. Medicines can help, and you may also need to make lifestyle changes. What are the symptoms? Symptoms of a TIA are the same as symptoms of a stroke. But symptoms of a TIA don't last very long. Most of the time, they go away in 10 to 20 minutes. They may include: 
· Sudden numbness, tingling, weakness, or loss of movement in your face, arm, or leg, especially on only one side of your body. · Sudden vision changes. · Sudden trouble speaking. · Sudden confusion or trouble understanding simple statements. · Sudden problems with walking or balance. If you have any of these symptoms, call 911 or other emergency services right away. Ask your family, friends, and coworkers to learn the signs of a TIA. They may notice these signs before you do. Make sure they know to call 911 if these signs appear. How is a TIA treated? If you've had a TIA, you may need more testing and treatment after you get checked by your doctor. If you have a high risk of stroke, you may have to stay in the hospital for treatment. Your treatment for a TIA may include taking medicines to prevent blood clots or a stroke, or having surgery to reopen narrow arteries. How can you prevent another TIA? · Work with your doctor to treat any health problems you have. High blood pressure, high cholesterol, atrial fibrillation, and diabetes all raise your chances of having a stroke. · Be safe with medicines. Take your medicine exactly as prescribed. Call your doctor if you think you are having a problem with your medicine. · Have a healthy lifestyle. ¨ Do not smoke or allow others to smoke around you. If you need help quitting, talk to your doctor about stop-smoking programs and medicines. These can increase your chances of quitting for good. Smoking makes a stroke more likely. ¨ Limit alcohol to 2 drinks a day for men and 1 drink a day for women. ¨ Lose weight if you need to. A healthy weight will help you keep your heart and body healthy. ¨ Be active. Ask your doctor what type and level of activity are safe for you. ¨ Eat heart-healthy foods, like fruits, vegetables, and high-fiber foods. Follow-up care is a key part of your treatment and safety. Be sure to make and go to all appointments, and call your doctor if you are having problems. It's also a good idea to know your test results and keep a list of the medicines you take. Where can you learn more? Go to http://adalberto-arielle.info/. Enter Q942 in the search box to learn more about \"Learning About Transient Ischemic Attack (TIA). \" 
Current as of: March 20, 2017 Content Version: 11.4 © 5378-7650 Healthwise, Incorporated. Care instructions adapted under license by [employer] (which disclaims liability or warranty for this information). If you have questions about a medical condition or this instruction, always ask your healthcare professional. [place] [address] any warranty or liability for your use of this information. Please provide this summary of care documentation to your next provider.  
  
  
 
  
Signatures-by signing, you are acknowledging that this After Visit Summary has been reviewed with you and you have received a copy. Patient Signature:  ____________________________________________________________ Date:  ____________________________________________________________  
  
Curlie Ruths Provider Signature:  ____________________________________________________________ Date:  ____________________________________________________________

## 2018-03-27 NOTE — ED TRIAGE NOTES
Has had headaches for several days. Left cheelk and left eye orbit feels swollen. Took ntg sl an hour ago. Feels worse. Hx cabg x 4. Left outer leg numbness.  Feels like falling asleep

## 2018-03-27 NOTE — IP AVS SNAPSHOT
[facility] 
 
 
 [address] [facility] [place] [address] 
[phone] Patient: Yossi López [facility] MRN: [id number] PTY:[id number]/20[birth year]0505 A check jose indicates which time of day the medication should be taken. My Medications START taking these medications Instructions Each Dose to Equal  
 Morning Noon Evening Bedtime  
 folic acid 1 mg tablet Commonly known as:  Google Your last dose was: Your next dose is: Take 1 Tab by mouth daily. 1 mg  
    
   
   
   
  
 nicotine 14 mg/24 hr patch Commonly known as:  Teena Hook Start taking on:  3/30/2018 Your last dose was: Your next dose is:    
   
   
 1 Patch by TransDERmal route daily for 30 days. 1 Patch  
    
   
   
   
  
 senna-docusate 8.6-50 mg per tablet Commonly known as:  Mamadou Encinas Your last dose was: Your next dose is: Take 2 Tabs by mouth nightly. 2 Tab Thiamine Mononitrate 100 mg tablet Commonly known as:  B-1 Your last dose was: Your next dose is: Take 1 Tab by mouth daily. 100 mg CONTINUE taking these medications Instructions Each Dose to Equal  
 Morning Noon Evening Bedtime  
 aspirin delayed-release 81 mg tablet Your last dose was: Your next dose is: Take 1 Tab by mouth daily. 81 mg  
    
   
   
   
  
 atorvastatin 80 mg tablet Commonly known as:  LIPITOR Your last dose was: Your next dose is: TAKE TABLET BY MOUTH ONCE NIGHTLY  
     
   
   
   
  
 butalbital-acetaminophen-caffeine -40 mg per tablet Commonly known as:  Edith Jose Your last dose was: Your next dose is: Take 1 Tab by mouth two (2) times daily as needed for Pain. 1 Tab  
    
   
   
   
  
 cetirizine 10 mg tablet Commonly known as:  ZYRTEC  
   
 Your last dose was: Your next dose is: Take 1 Tab by mouth two (2) times a day. 10 mg  
    
   
   
   
  
 clopidogrel 75 mg Tab Commonly known as:  PLAVIX Your last dose was: Your next dose is: TAKE ONE TABLET BY MOUTH ONCE DAILY  
     
   
   
   
  
 escitalopram oxalate 20 mg tablet Commonly known as:  Sherif Fossa Your last dose was: Your next dose is: Take 20 mg by mouth daily. 20 mg  
    
   
   
   
  
 furosemide 20 mg tablet Commonly known as:  LASIX Your last dose was: Your next dose is: Take  by mouth daily. gabapentin 300 mg capsule Commonly known as:  NEURONTIN Your last dose was: Your next dose is: Take 1 capsule by mouth once in the morning and 2 capsules at bedtime for leg cramps. hydrOXYzine pamoate 25 mg capsule Commonly known as:  VISTARIL Your last dose was: Your next dose is: Take 1 Cap by mouth three (3) times daily as needed for Itching. 25 mg  
    
   
   
   
  
 metoprolol tartrate 25 mg tablet Commonly known as:  LOPRESSOR Your last dose was: Your next dose is: Take 0.5 Tabs by mouth two (2) times a day. 12.5 mg  
    
   
   
   
  
 nitroglycerin 0.4 mg SL tablet Commonly known as:  NITROSTAT Your last dose was: Your next dose is:    
   
   
 1 Tab by SubLINGual route every five (5) minutes as needed for Chest Pain. 0.4 mg  
    
   
   
   
  
 permethrin 5 % topical cream  
Commonly known as:  Mal Antoinette Your last dose was: Your next dose is:    
   
   
 apply sparingly as directed Apply to entire body and leave on for 10-12 hours; may repeat in 2 weeks  
     
   
   
   
  
 risperiDONE 2 mg tablet Commonly known as:  RisperDAL Your last dose was: Your next dose is: Take 2 mg by mouth. 2 mg  
    
   
   
   
  
 topiramate 50 mg tablet Commonly known as:  TOPAMAX Your last dose was: Your next dose is: Take 50 mg by mouth two (2) times a day. Indications: MIGRAINE PREVENTION 50 mg Where to Get Your Medications Information on where to get these meds will be given to you by the nurse or doctor. ! Ask your nurse or doctor about these medications  
  folic acid 1 mg tablet  
 nicotine 14 mg/24 hr patch  
 senna-docusate 8.6-50 mg per tablet Thiamine Mononitrate 100 mg tablet

## 2018-03-27 NOTE — ED PROVIDER NOTES
EMERGENCY DEPARTMENT HISTORY AND PHYSICAL EXAM    2:54 PM      Date: 3/27/2018  Patient Name: Grecia Cavazos [facility]    History of Presenting Illness     Chief Complaint   Patient presents with    Headache    Numbness    Facial Swelling         History Provided By: Patient    Chief Complaint: Weakness  Duration: 4.5 Hours  Timing:  Gradual and Worsening  Location: LLE, LUE  Quality: soreness, feels different than other leg  Severity: Mild  Modifying Factors: N/A   Associated Symptoms: L facial numbness, LUE numbness      Additional History (Context): Teresa Quinonez is a 59 y.o. male with hypertension, hyperlipidemia, CAD, CABG, tobacco use who presents with L sided weakness starting 4.5 hrs PTA. Pt notes gradually worsening LLE weakness, LUE change in sensation, facial numbness \"feeling like it's swelling\". Associated sx include intermittent L sided HA for a couple of days and mild chest tightness this AM which has now resolved. Denies trauma or head injury, hx prior weakness or numbness. PCP: Roslyn Jason MD    Current Outpatient Prescriptions   Medication Sig Dispense Refill    clopidogrel (PLAVIX) 75 mg tab TAKE ONE TABLET BY MOUTH ONCE DAILY 30 Tab 6    risperiDONE (RISPERDAL) 2 mg tablet Take 2 mg by mouth.  escitalopram oxalate (LEXAPRO) 20 mg tablet Take 20 mg by mouth daily.  cetirizine (ZYRTEC) 10 mg tablet Take 1 Tab by mouth two (2) times a day. 30 Tab 0    hydrOXYzine pamoate (VISTARIL) 25 mg capsule Take 1 Cap by mouth three (3) times daily as needed for Itching. 30 Cap 0    permethrin (ELIMITE) 5 % topical cream apply sparingly as directed  Apply to entire body and leave on for 10-12 hours; may repeat in 2 weeks 60 g 1    atorvastatin (LIPITOR) 80 mg tablet TAKE TABLET BY MOUTH ONCE NIGHTLY 30 Tab 6    metoprolol tartrate (LOPRESSOR) 25 mg tablet Take 0.5 Tabs by mouth two (2) times a day. 30 Tab 6    furosemide (LASIX) 20 mg tablet Take  by mouth daily.       aspirin delayed-release 81 mg tablet Take 1 Tab by mouth daily. 30 Tab 7    butalbital-acetaminophen-caffeine (FIORICET, ESGIC) -40 mg per tablet Take 1 Tab by mouth two (2) times daily as needed for Pain.  nitroglycerin (NITROSTAT) 0.4 mg SL tablet 1 Tab by SubLINGual route every five (5) minutes as needed for Chest Pain. 1 Bottle 3    topiramate (TOPAMAX) 50 mg tablet Take 50 mg by mouth two (2) times a day. Indications: MIGRAINE PREVENTION      gabapentin (NEURONTIN) 300 mg capsule Take 1 capsule by mouth once in the morning and 2 capsules at bedtime for leg cramps. Past History     Past Medical History:  Past Medical History:   Diagnosis Date    CAD (coronary artery disease)     Chronic pain     GI bleed 11/2/2011    Hernia     HLD (hyperlipidemia)     HTN (hypertension)     Non compliance w medication regimen     S/P CABG x 3 01/16    LIMA to LAD, SVG to PDA and OM1    Tobacco abuse        Past Surgical History:  Past Surgical History:   Procedure Laterality Date    HX HERNIA REPAIR Right     15 years    HX HERNIA REPAIR Left 2/10/2015    Left inguinal hernia repair       Family History:  Family History   Problem Relation Age of Onset    Diabetes Sister        Social History:  Social History   Substance Use Topics    Smoking status: Current Some Day Smoker     Packs/day: 0.50     Types: Cigarettes     Last attempt to quit: 1/16/2016    Smokeless tobacco: Never Used    Alcohol use No       Allergies: Allergies   Allergen Reactions    Aspirin Other (comments)     Stomach bleed         Review of Systems       Review of Systems   Constitutional: Negative for activity change, fatigue and fever. HENT: Negative for congestion and rhinorrhea. Eyes: Negative for visual disturbance. Respiratory: Negative for shortness of breath. Cardiovascular: Negative for chest pain and palpitations. Gastrointestinal: Negative for abdominal pain, diarrhea, nausea and vomiting.    Genitourinary: Negative for dysuria and hematuria. Musculoskeletal: Negative for back pain. Skin: Negative for rash. Neurological: Positive for weakness (L sided), numbness (L sided) and headaches. Negative for dizziness and light-headedness. Psychiatric/Behavioral: Negative for agitation. All other systems reviewed and are negative. Physical Exam     Visit Vitals    /80 (BP 1 Location: Right arm, BP Patient Position: At rest)    Pulse (!) 52    Temp 97.5 °F (36.4 °C)    Resp 15    Wt 65.8 kg (145 lb)    SpO2 99%    BMI 27.4 kg/m2         Physical Exam   Constitutional: He appears well-developed and well-nourished. HENT:   Head: Normocephalic and atraumatic. Eyes: Conjunctivae are normal. Pupils are equal, round, and reactive to light. Neck: Normal range of motion. Neck supple. Cardiovascular: Normal rate and regular rhythm. Pulmonary/Chest: Effort normal and breath sounds normal.   Abdominal: Soft. Bowel sounds are normal.   Musculoskeletal: Normal range of motion. Lymphadenopathy:     He has no cervical adenopathy. Neurological: He is alert. L face decreased sensation, no facial droop  Decreased sensation in LUE and LLE per pt  LUE and LLE nml strength,  subjective weakness per pt   No pronator drift  Finger-to-nose nml   Skin: Skin is warm. Psychiatric: He has a normal mood and affect.          Diagnostic Study Results     Labs -  Recent Results (from the past 12 hour(s))   GLUCOSE, POC    Collection Time: 03/27/18  3:07 PM   Result Value Ref Range    Glucose (POC) 91 70 - 110 mg/dL   CBC WITH AUTOMATED DIFF    Collection Time: 03/27/18  3:10 PM   Result Value Ref Range    WBC 8.3 4.6 - 13.2 K/uL    RBC 5.34 4.70 - 5.50 M/uL    HGB 17.2 (H) 13.0 - 16.0 g/dL    HCT 51.0 (H) 36.0 - 48.0 %    MCV 95.5 74.0 - 97.0 FL    MCH 32.2 24.0 - 34.0 PG    MCHC 33.7 31.0 - 37.0 g/dL    RDW 13.1 11.6 - 14.5 %    PLATELET 244 678 - 080 K/uL    MPV 10.4 9.2 - 11.8 FL    NEUTROPHILS 48 40 - 73 % LYMPHOCYTES 44 21 - 52 %    MONOCYTES 7 3 - 10 %    EOSINOPHILS 1 0 - 5 %    BASOPHILS 0 0 - 2 %    ABS. NEUTROPHILS 4.1 1.8 - 8.0 K/UL    ABS. LYMPHOCYTES 3.6 0.9 - 3.6 K/UL    ABS. MONOCYTES 0.5 0.05 - 1.2 K/UL    ABS. EOSINOPHILS 0.0 0.0 - 0.4 K/UL    ABS. BASOPHILS 0.0 0.0 - 0.06 K/UL    DF AUTOMATED     METABOLIC PANEL, COMPREHENSIVE    Collection Time: 03/27/18  3:10 PM   Result Value Ref Range    Sodium 139 136 - 145 mmol/L    Potassium 4.4 3.5 - 5.5 mmol/L    Chloride 108 100 - 108 mmol/L    CO2 27 21 - 32 mmol/L    Anion gap 4 3.0 - 18 mmol/L    Glucose 86 74 - 99 mg/dL    BUN 16 7.0 - 18 MG/DL    Creatinine 1.04 0.6 - 1.3 MG/DL    BUN/Creatinine ratio 15 12 - 20      GFR est AA >60 >60 ml/min/1.73m2    GFR est non-AA >60 >60 ml/min/1.73m2    Calcium 8.7 8.5 - 10.1 MG/DL    Bilirubin, total 0.4 0.2 - 1.0 MG/DL    ALT (SGPT) 19 16 - 61 U/L    AST (SGOT) 12 (L) 15 - 37 U/L    Alk.  phosphatase 98 45 - 117 U/L    Protein, total 7.0 6.4 - 8.2 g/dL    Albumin 3.7 3.4 - 5.0 g/dL    Globulin 3.3 2.0 - 4.0 g/dL    A-G Ratio 1.1 0.8 - 1.7     MAGNESIUM    Collection Time: 03/27/18  3:10 PM   Result Value Ref Range    Magnesium 2.4 1.6 - 2.6 mg/dL   PROTHROMBIN TIME + INR    Collection Time: 03/27/18  3:10 PM   Result Value Ref Range    Prothrombin time 12.8 11.5 - 15.2 sec    INR 1.0 0.8 - 1.2     CARDIAC PANEL,(CK, CKMB & TROPONIN)    Collection Time: 03/27/18  3:10 PM   Result Value Ref Range    CK 88 39 - 308 U/L    CK - MB 1.8 <3.6 ng/ml    CK-MB Index 2.0 0.0 - 4.0 %    Troponin-I, Qt. <0.02 0.0 - 0.045 NG/ML   THROMBIN TIME    Collection Time: 03/27/18  3:10 PM   Result Value Ref Range    Thrombin time 16.0 13.8 - 18.2 SECS   FIBRINOGEN    Collection Time: 03/27/18  3:10 PM   Result Value Ref Range    Fibrinogen 453 (H) 210 - 451 mg/dL   TYPE & SCREEN    Collection Time: 03/27/18  3:10 PM   Result Value Ref Range    Crossmatch Expiration 03/30/2018     ABO/Rh(D) O NEGATIVE     Antibody screen NEG    ASPIRIN TEST    Collection Time: 03/27/18  3:10 PM   Result Value Ref Range    Aspirin test 556 (L) 620 - 672 ARU   EKG, 12 LEAD, INITIAL    Collection Time: 03/27/18  3:11 PM   Result Value Ref Range    Ventricular Rate 51 BPM    Atrial Rate 51 BPM    P-R Interval 116 ms    QRS Duration 66 ms    Q-T Interval 410 ms    QTC Calculation (Bezet) 377 ms    Calculated P Axis 52 degrees    Calculated R Axis 52 degrees    Calculated T Axis 48 degrees    Diagnosis       Sinus bradycardia  Possible Left atrial enlargement  Nonspecific T wave abnormality  Abnormal ECG  When compared with ECG of 02-FEB-2016 12:48,  T wave inversion no longer evident in Inferior leads  T wave inversion no longer evident in Anterolateral leads     URINALYSIS W/ RFLX MICROSCOPIC    Collection Time: 03/27/18  4:23 PM   Result Value Ref Range    Color YELLOW      Appearance CLEAR      Specific gravity 1.020 1.005 - 1.030      pH (UA) 5.0 5.0 - 8.0      Protein NEGATIVE  NEG mg/dL    Glucose NEGATIVE  NEG mg/dL    Ketone NEGATIVE  NEG mg/dL    Bilirubin NEGATIVE  NEG      Blood NEGATIVE  NEG      Urobilinogen 0.2 0.2 - 1.0 EU/dL    Nitrites NEGATIVE  NEG      Leukocyte Esterase NEGATIVE  NEG     DRUG SCREEN, URINE    Collection Time: 03/27/18  4:23 PM   Result Value Ref Range    BENZODIAZEPINES NEGATIVE  NEG      BARBITURATES NEGATIVE  NEG      THC (TH-CANNABINOL) POSITIVE (A) NEG      OPIATES NEGATIVE  NEG      PCP(PHENCYCLIDINE) NEGATIVE  NEG      COCAINE NEGATIVE  NEG      AMPHETAMINES NEGATIVE  NEG      METHADONE NEGATIVE  NEG      HDSCOM (NOTE)        Radiologic Studies -   CT HEAD WO CONT   Final Result   As read by RAD:    IMPRESSION:     1. No acute or other significant intracranial abnormalities are identified. No CT evidence to suggest acute intracranial hematoma, cortical infarct, or mass  effect/mass lesion.  CODE S report called to physician assistant Agapito Velasquez at  3:08 PM     XR CHEST PORT       As read by RAD:    Impression:  No radiographic evidence of an acute abnormality           Medical Decision Making   I am the first provider for this patient. I reviewed the vital signs, available nursing notes, past medical history, past surgical history, family history and social history. Vital Signs-Reviewed the patient's vital signs. Pulse Oximetry Analysis -  99% on room air (Interpretation) nonhypoxic     Cardiac Monitor:  Rate: 52    EKG: Interpreted by the EP. Time Interpreted: 1513   Rate: 51   Rhythm: sinus bradycardia   Interpretation: nml intervals, nonspecific STT abnormality       Records Reviewed: Nursing Notes (Time of Review: 2:54 PM)    ED Course: Progress Notes, Reevaluation, and Consults:    15:00 Consult:  Discussed care with Dr. Ayesha Russo (Teleneurology). Standard discussion; including history of patients chief complaint, available diagnostic results, and treatment course. Will see the pt.     15:06 Discussed with Dr. Ayesha Russo. Recommends admission due to acute sx of left sided weakness and pt's risk factor hx.    16:49 Pt has nml labs, nml troponin, nml CT and CXR. Pt given diagnosis of L sided numbness and weakness, chest pain and hx CABG. Needs to be admitted for further observation and management. 17:08 Consult:  Discussed care with Dr. Juan Carlos Moeller (Hospitalist). Standard discussion; including history of patients chief complaint, available diagnostic results, and treatment course. Accepts for admission. Provider Notes (Medical Decision Making):     Pt with L sided numbness, weakness improving per pt while in ER. Code S activated, CT negative, pt seen by Neurology. Concern for TIA. High risk from hx r/o ACS admit for further management. For Hospitalized Patients:    1. Hospitalization Decision Time:  The decision to hospitalize the patient was made by Dr. Ayesha Russo and Dr. Holly Benitez at [phone] on 3/27/2018    2. Aspirin: Aspirin was given    Diagnosis     Clinical Impression:   1.  Nonintractable headache, unspecified chronicity pattern, unspecified headache type    2. Left-sided weakness    3. Left sided numbness    4. Chest pain, unspecified type        Disposition: Admit        Patient's Medications   Start Taking    No medications on file   Continue Taking    ASPIRIN DELAYED-RELEASE 81 MG TABLET    Take 1 Tab by mouth daily. ATORVASTATIN (LIPITOR) 80 MG TABLET    TAKE TABLET BY MOUTH ONCE NIGHTLY    BUTALBITAL-ACETAMINOPHEN-CAFFEINE (FIORICET, ESGIC) -40 MG PER TABLET    Take 1 Tab by mouth two (2) times daily as needed for Pain. CETIRIZINE (ZYRTEC) 10 MG TABLET    Take 1 Tab by mouth two (2) times a day. CLOPIDOGREL (PLAVIX) 75 MG TAB    TAKE ONE TABLET BY MOUTH ONCE DAILY    ESCITALOPRAM OXALATE (LEXAPRO) 20 MG TABLET    Take 20 mg by mouth daily. FUROSEMIDE (LASIX) 20 MG TABLET    Take  by mouth daily. GABAPENTIN (NEURONTIN) 300 MG CAPSULE    Take 1 capsule by mouth once in the morning and 2 capsules at bedtime for leg cramps. HYDROXYZINE PAMOATE (VISTARIL) 25 MG CAPSULE    Take 1 Cap by mouth three (3) times daily as needed for Itching. METOPROLOL TARTRATE (LOPRESSOR) 25 MG TABLET    Take 0.5 Tabs by mouth two (2) times a day. NITROGLYCERIN (NITROSTAT) 0.4 MG SL TABLET    1 Tab by SubLINGual route every five (5) minutes as needed for Chest Pain. PERMETHRIN (ELIMITE) 5 % TOPICAL CREAM    apply sparingly as directed  Apply to entire body and leave on for 10-12 hours; may repeat in 2 weeks    RISPERIDONE (RISPERDAL) 2 MG TABLET    Take 2 mg by mouth. TOPIRAMATE (TOPAMAX) 50 MG TABLET    Take 50 mg by mouth two (2) times a day.  Indications: MIGRAINE PREVENTION   These Medications have changed    No medications on file   Stop Taking    No medications on file     _______________________________    Attestations:  Scribe Attestation     Saji Pavon acting as a scribe for and in the presence of Gaurva Esparza MD      March 27, 2018 at 5:31 PM       Provider Attestation:      I personally performed the services described in the documentation, reviewed the documentation, as recorded by the scribe in my presence, and it accurately and completely records my words and actions.  March 27, 2018 at 5:31 PM - Nhi Saha MD    _______________________________

## 2018-03-27 NOTE — ED NOTES
Jonah Max is a 59 y.o. male with a pertinent history of CAD s/p CABG x 4, HTN, HLD, chronic pain, tobacco abuse, GI Bleed, medication noncompliance who presents to the emergency department for evaluation of headaches, chest pain, left leg numbness/weakness, and left facial heaviness since 1030 or 11am this morning. He also reports feeling tired and lightheaded, which is unusual for him. Took one nitroglycerin tablet, but denies improvement in his symptoms. I performed a brief evaluation, including pertinent history and physical, of the patient here in triage and I have determined that patient will need further treatment and evaluation from the main side ER physician. I have placed initial orders to help in expediting patients care. March 27, 2018 at 2:50 PM - Elham Dobbins PA-C        There were no vitals taken for this visit.

## 2018-03-27 NOTE — ED NOTES
Patient brought to the floor with this RN, on the cardiac monitor, NIH completed with this nurse and Abhishek Velásquez RN

## 2018-03-27 NOTE — ED NOTES
Patient reports he feels better and the tingling has decreased. Noted patient to have slight swelling of left cheek.

## 2018-03-28 ENCOUNTER — APPOINTMENT (OUTPATIENT)
Dept: MRI IMAGING | Age: 58
End: 2018-03-28
Attending: INTERNAL MEDICINE
Payer: MEDICAID

## 2018-03-28 PROBLEM — G43.009 MIGRAINE HEADACHE WITHOUT AURA: Chronic | Status: ACTIVE | Noted: 2018-03-28

## 2018-03-28 LAB
ATRIAL RATE: 60 BPM
CALCULATED P AXIS, ECG09: 73 DEGREES
CALCULATED R AXIS, ECG10: 77 DEGREES
CALCULATED T AXIS, ECG11: 9 DEGREES
CK MB CFR SERPL CALC: 2.7 % (ref 0–4)
CK MB SERPL-MCNC: 1.5 NG/ML (ref 5–25)
CK SERPL-CCNC: 55 U/L (ref 39–308)
DIAGNOSIS, 93000: NORMAL
GLUCOSE BLD STRIP.AUTO-MCNC: 108 MG/DL (ref 70–110)
GLUCOSE BLD STRIP.AUTO-MCNC: 92 MG/DL (ref 70–110)
P-R INTERVAL, ECG05: 114 MS
Q-T INTERVAL, ECG07: 398 MS
QRS DURATION, ECG06: 68 MS
QTC CALCULATION (BEZET), ECG08: 398 MS
TROPONIN I SERPL-MCNC: <0.02 NG/ML (ref 0–0.04)
VENTRICULAR RATE, ECG03: 60 BPM

## 2018-03-28 PROCEDURE — 77030021566 MRA NECK W WO CONT

## 2018-03-28 PROCEDURE — 97161 PT EVAL LOW COMPLEX 20 MIN: CPT

## 2018-03-28 PROCEDURE — 99218 HC RM OBSERVATION: CPT

## 2018-03-28 PROCEDURE — 74011000258 HC RX REV CODE- 258: Performed by: INTERNAL MEDICINE

## 2018-03-28 PROCEDURE — 77030021566 MRI BRAIN W WO CONT

## 2018-03-28 PROCEDURE — 74011250637 HC RX REV CODE- 250/637: Performed by: NURSE PRACTITIONER

## 2018-03-28 PROCEDURE — A9575 INJ GADOTERATE MEGLUMI 0.1ML: HCPCS | Performed by: HOSPITALIST

## 2018-03-28 PROCEDURE — 74011250637 HC RX REV CODE- 250/637: Performed by: FAMILY MEDICINE

## 2018-03-28 PROCEDURE — 74011250636 HC RX REV CODE- 250/636: Performed by: HOSPITALIST

## 2018-03-28 PROCEDURE — 97165 OT EVAL LOW COMPLEX 30 MIN: CPT

## 2018-03-28 PROCEDURE — 82962 GLUCOSE BLOOD TEST: CPT

## 2018-03-28 PROCEDURE — 93306 TTE W/DOPPLER COMPLETE: CPT

## 2018-03-28 PROCEDURE — 96372 THER/PROPH/DIAG INJ SC/IM: CPT

## 2018-03-28 PROCEDURE — 77030009834 HC MSK O2 TRACH VYRM -A

## 2018-03-28 PROCEDURE — 77030020263 HC SOL INJ SOD CL0.9% LFCR 1000ML

## 2018-03-28 PROCEDURE — 74011250636 HC RX REV CODE- 250/636: Performed by: FAMILY MEDICINE

## 2018-03-28 PROCEDURE — 74011250637 HC RX REV CODE- 250/637: Performed by: INTERNAL MEDICINE

## 2018-03-28 PROCEDURE — 96374 THER/PROPH/DIAG INJ IV PUSH: CPT

## 2018-03-28 PROCEDURE — 70544 MR ANGIOGRAPHY HEAD W/O DYE: CPT

## 2018-03-28 PROCEDURE — 74011000250 HC RX REV CODE- 250: Performed by: INTERNAL MEDICINE

## 2018-03-28 PROCEDURE — 96375 TX/PRO/DX INJ NEW DRUG ADDON: CPT

## 2018-03-28 PROCEDURE — 36415 COLL VENOUS BLD VENIPUNCTURE: CPT | Performed by: FAMILY MEDICINE

## 2018-03-28 PROCEDURE — 82550 ASSAY OF CK (CPK): CPT | Performed by: FAMILY MEDICINE

## 2018-03-28 PROCEDURE — 74011250636 HC RX REV CODE- 250/636: Performed by: INTERNAL MEDICINE

## 2018-03-28 RX ORDER — GADOTERATE MEGLUMINE 376.9 MG/ML
12 INJECTION INTRAVENOUS
Status: COMPLETED | OUTPATIENT
Start: 2018-03-28 | End: 2018-03-28

## 2018-03-28 RX ORDER — SODIUM CHLORIDE 9 MG/ML
100 INJECTION, SOLUTION INTRAVENOUS CONTINUOUS
Status: DISCONTINUED | OUTPATIENT
Start: 2018-03-28 | End: 2018-03-28

## 2018-03-28 RX ORDER — SODIUM CHLORIDE 9 MG/ML
500 INJECTION, SOLUTION INTRAVENOUS
Status: COMPLETED | OUTPATIENT
Start: 2018-03-28 | End: 2018-03-28

## 2018-03-28 RX ORDER — TOPIRAMATE 25 MG/1
50 TABLET ORAL 2 TIMES DAILY
Status: DISCONTINUED | OUTPATIENT
Start: 2018-03-28 | End: 2018-03-29 | Stop reason: HOSPADM

## 2018-03-28 RX ADMIN — FOLIC ACID: 5 INJECTION, SOLUTION INTRAMUSCULAR; INTRAVENOUS; SUBCUTANEOUS at 17:05

## 2018-03-28 RX ADMIN — CETIRIZINE HYDROCHLORIDE 10 MG: 10 TABLET, FILM COATED ORAL at 17:57

## 2018-03-28 RX ADMIN — SODIUM CHLORIDE 500 ML/HR: 900 INJECTION, SOLUTION INTRAVENOUS at 00:20

## 2018-03-28 RX ADMIN — ATORVASTATIN CALCIUM 80 MG: 40 TABLET, FILM COATED ORAL at 22:28

## 2018-03-28 RX ADMIN — GABAPENTIN 300 MG: 300 CAPSULE ORAL at 17:57

## 2018-03-28 RX ADMIN — GABAPENTIN 300 MG: 300 CAPSULE ORAL at 09:30

## 2018-03-28 RX ADMIN — HEPARIN SODIUM 5000 UNITS: 5000 INJECTION, SOLUTION INTRAVENOUS; SUBCUTANEOUS at 22:29

## 2018-03-28 RX ADMIN — GADOTERATE MEGLUMINE 12 ML: 376.9 INJECTION INTRAVENOUS at 20:44

## 2018-03-28 RX ADMIN — STANDARDIZED SENNA CONCENTRATE AND DOCUSATE SODIUM 2 TABLET: 8.6; 5 TABLET, FILM COATED ORAL at 22:28

## 2018-03-28 RX ADMIN — HEPARIN SODIUM 5000 UNITS: 5000 INJECTION, SOLUTION INTRAVENOUS; SUBCUTANEOUS at 03:20

## 2018-03-28 RX ADMIN — CLOPIDOGREL BISULFATE 75 MG: 75 TABLET, FILM COATED ORAL at 09:30

## 2018-03-28 RX ADMIN — METOPROLOL TARTRATE 12.5 MG: 25 TABLET ORAL at 17:57

## 2018-03-28 RX ADMIN — SODIUM CHLORIDE 100 ML/HR: 900 INJECTION, SOLUTION INTRAVENOUS at 03:19

## 2018-03-28 RX ADMIN — ALBUTEROL SULFATE 2.5 MG: 2.5 SOLUTION RESPIRATORY (INHALATION) at 03:28

## 2018-03-28 RX ADMIN — SODIUM CHLORIDE 500 ML/HR: 900 INJECTION, SOLUTION INTRAVENOUS at 01:22

## 2018-03-28 RX ADMIN — ESCITALOPRAM OXALATE 20 MG: 20 TABLET, FILM COATED ORAL at 09:29

## 2018-03-28 RX ADMIN — ASPIRIN 81 MG: 81 TABLET, COATED ORAL at 09:30

## 2018-03-28 RX ADMIN — RISPERIDONE 2 MG: 1 TABLET ORAL at 22:28

## 2018-03-28 RX ADMIN — CETIRIZINE HYDROCHLORIDE 10 MG: 10 TABLET, FILM COATED ORAL at 09:29

## 2018-03-28 RX ADMIN — TOPIRAMATE 50 MG: 25 TABLET, FILM COATED ORAL at 17:57

## 2018-03-28 RX ADMIN — HEPARIN SODIUM 5000 UNITS: 5000 INJECTION, SOLUTION INTRAVENOUS; SUBCUTANEOUS at 12:31

## 2018-03-28 NOTE — PROGRESS NOTES
Problem: Self Care Deficits Care Plan (Adult)  Goal: *Acute Goals and Plan of Care (Insert Text)  Outcome: Resolved/Met Date Met: 03/28/18  Occupational Therapy EVALUATION/discharge    Patient: Bevelyn Seidel Skiff (53 y.o. male)  Date: 3/28/2018  Primary Diagnosis: TIA (transient ischemic attack)        Precautions:  Fall  PLOF: Pt was independent with basic self care tasks, IADLs and functional mobility PTA. Still driving PTA. ASSESSMENT AND RECOMMENDATIONS:  Based on the objective data described below, the patient presents at baseline with regard to bed mobility, functional mobility & independence in ADLs. Pt supine on arrival, no c/o pain. Reports complete resolution of LUE weakness/numbness. Mod I for bed mobility. Full AROM/strength of BUEs; intact sensation. [place] in LB dressing & grooming tasks. Mod I in functional transfers & toileting. Pt educated on role of OT and home safety; he verbalized understanding. Declined to sit in chair at end of session; returned supine, needs within reach. No complaints. Skilled occupational therapy is not indicated at this time. Discharge Recommendations: Anticipate none  Further Equipment Recommendations for Discharge: Anticipate none     SUBJECTIVE:   Patient stated I feel better than I did.     OBJECTIVE DATA SUMMARY:     Past Medical History:   Diagnosis Date    CAD (coronary artery disease)     Chronic pain     GI bleed 11/2/2011    Hernia     HLD (hyperlipidemia)     HTN (hypertension)     Non compliance w medication regimen     S/P CABG x 3 01/16    LIMA to LAD, SVG to PDA and OM1    Tobacco abuse      Past Surgical History:   Procedure Laterality Date    HX HERNIA REPAIR Right     15 years    HX HERNIA REPAIR Left 2/10/2015    Left inguinal hernia repair     Barriers to Learning/Limitations: None  Compensate with: visual, verbal, tactile, kinesthetic cues/model    G CODES:  Self Care  Current  CI= 1-19%   Goal  CI= 1-19%   D/C CI= 1-19%. The severity rating is based on the Other Functional Assessment, MMT, ROM    Eval Complexity: History: LOW Complexity : Brief history review ; Examination: LOW Complexity : 1-3 performance deficits relating to physical, cognitive , or psychosocial skils that result in activity limitations and / or participation restrictions ; Decision Making:LOW Complexity : No comorbidities that affect functional and no verbal or physical assistance needed to complete eval tasks      Prior Level of Function/Home Situation: Pt was independent with basic self care tasks, IADLs and functional mobility PTA. Still driving PTA. Home Situation  Home Environment: Private residence  # Steps to Enter: 3  Rails to Enter: Yes  Hand Rails : Bilateral  One/Two Story Residence: One story  Living Alone: No  Support Systems: Family member(s)  Patient Expects to be Discharged to[de-identified] Private residence  Current DME Used/Available at Home: None  Tub or Shower Type: Tub/Shower combination (no grab bars or shower chair)  [x]     Right hand dominant   []     Left hand dominant    Cognitive/Behavioral Status:  Neurologic State: Alert  Orientation Level: Oriented X4  Cognition: Appropriate decision making; Appropriate for age attention/concentration; Appropriate safety awareness; Follows commands  Safety/Judgement: Awareness of environment; Fall prevention     Skin: Intact (BUEs)  Edema: None noted (BUEs)    Vision/Perceptual:    Acuity: Within Defined Limits      Coordination:  Coordination: Within functional limits (BUEs)  Fine Motor Skills-Upper: Right Intact; Left Intact    Gross Motor Skills-Upper: Right Intact; Left Intact     Balance:  Sitting: Intact  Standing: Impaired  Standing - Static: Good  Standing - Dynamic : Good     Strength:  Strength:  Within functional limits (BUEs: 5/5)    Range of Motion:  AROM: Within functional limits (BUEs: full shoulder/elbow flex)    Functional Mobility and Transfers for ADLs:  Bed Mobility:  Supine to Sit: Modified independent  Sit to Supine: Modified independent    Transfers:  Sit to Stand: Modified independent   Toilet Transfer : Modified independent    ADL Assessment:  Feeding: Independent  Oral Facial Hygiene/Grooming: Independent  Bathing: Modified independent  Upper Body Dressing: Independent  Lower Body Dressing: Independent  Toileting: Modified independent    Cognitive Retraining  Safety/Judgement: Awareness of environment; Fall prevention    Pain:  Pre-treatment pain level: 0/10  Post treatment pain level: 0/10  Pain Scale 1: Numeric (0 - 10)    Activity Tolerance:  Good  Please refer to the flowsheet for vital signs taken during this treatment. After treatment:   []  Patient left in no apparent distress sitting up in chair  [x]  Patient left in no apparent distress in bed  [x]  Call bell left within reach  [x]  Nursing notified  []  Caregiver present  []  Bed alarm activated    COMMUNICATION/EDUCATION: Pt educated on role of OT, POC and home safety. He verbalized understanding. Communication/Collaboration:  [x]      Home safety education was provided and the patient/caregiver indicated understanding. [x]      Patient/family have participated as able and agree with findings and recommendations. []      Patient is unable to participate in plan of care at this time.     Susie Mccarthy MS OTR/L  Time Calculation: 12 mins

## 2018-03-28 NOTE — ACP (ADVANCE CARE PLANNING)
Patient has designated his sister to participate in his/her discharge plan and to receive any needed information.      Name: Vasiliy Pacheco  Address:  Phone number: [phone]

## 2018-03-28 NOTE — PROGRESS NOTES
Problem: Falls - Risk of  Goal: *Absence of Falls  Document Andrea Fall Risk and appropriate interventions in the flowsheet.    Outcome: Progressing Towards Goal  Fall Risk Interventions:            Medication Interventions: Evaluate medications/consider consulting pharmacy, Patient to call before getting OOB

## 2018-03-28 NOTE — PROGRESS NOTES
SLP rec'd evaluation & treat orders. Chart reviewed and upon initial interview, it is deemed that a formal evaluation is not indicated. Pt A&Ox4; effective communicator. Pt's basic cognitive-linguistic function appears intact at this time. Observed pt with serial swallows of thin liquids; 0 overt distress noted. Accordingly, SLP will discontinue MD orders, as evaluation not indicated. SLP available for formal evaluation if further indicated by MD.    SLP educated pt on role of speech therapist in current setting with re: speech/swallow; verbalized comprehension. Results d/w RN. Thank you for this referral.  Emre Nina., [id number] [facility]  Office: [phone]  Pager: [phone]

## 2018-03-28 NOTE — PROGRESS NOTES
Problem: Mobility Impaired (Adult and Pediatric)  Goal: *Acute Goals and Plan of Care (Insert Text)  Outcome: Resolved/Met Date Met: 03/28/18  physical Therapy EVALUATION & Discharge    Patient: Boris Epps (85 y.o. male)  Date: 3/28/2018  Primary Diagnosis: TIA (transient ischemic attack)        Precautions:   Fall    ASSESSMENT AND RECOMMENDATIONS:  Based on the objective data described below, the patient presents at independent level with ambulation and transfers without an assistive device. Skilled physical therapy is not indicated at this time. Discharge Recommendations: None  Further Equipment Recommendations for Discharge: N/A      G-CODES:     Mobility  Current  CH= 0%   Goal  CH= 0%  D/C  CH= 0%. The severity rating is based on the Level of Assistance required for Functional Mobility and ADLs. Eval Complexity: History: MEDIUM  Complexity : 1-2 comorbidities / personal factors will impact the outcome/ POC Exam:LOW Complexity : 1-2 Standardized tests and measures addressing body structure, function, activity limitation and / or participation in recreation  Presentation: LOW Complexity : Stable, uncomplicated  Clinical Decision Making:Low Complexity , Overall Complexity:LOW     SUBJECTIVE:   Patient stated I'm feeling better.     OBJECTIVE DATA SUMMARY:     Past Medical History:   Diagnosis Date    CAD (coronary artery disease)     Chronic pain     GI bleed 11/2/2011    Hernia     HLD (hyperlipidemia)     HTN (hypertension)     Non compliance w medication regimen     S/P CABG x 3 01/16    LIMA to LAD, SVG to PDA and OM1    Tobacco abuse      Past Surgical History:   Procedure Laterality Date    HX HERNIA REPAIR Right     15 years    HX HERNIA REPAIR Left 2/10/2015    Left inguinal hernia repair     Barriers to Learning/Limitations: None  Compensate with: visual, verbal, tactile, kinesthetic cues/model  Prior Level of Function/Home Situation: independent with mobility without an assistive device  Home Situation  Home Environment: Private residence  # Steps to Enter: 3  Rails to Enter: Yes  Hand Rails : Bilateral  One/Two Story Residence: One story  Living Alone: No  Support Systems: Family member(s)  Patient Expects to be Discharged to[de-identified] Private residence  Current DME Used/Available at Home: None  Tub or Shower Type: Tub/Shower combination (no grab bars or shower chair)  Critical Behavior:  Neurologic State: Alert  Orientation Level: Oriented X4  Cognition: Appropriate decision making; Appropriate for age attention/concentration; Appropriate safety awareness; Follows commands  Safety/Judgement: Awareness of environment; Fall prevention  Strength:    Strength: Within functional limits (B LEs)      Range Of Motion:  AROM: Within functional limits (B LEs)   Functional Mobility:  Bed Mobility:  Rolling: Independent  Supine to Sit: Independent  Sit to Supine: Independent     Transfers:  Sit to Stand: Independent  Stand to Sit: Independent  Balance:   Sitting: Intact  Standing: Without support  Standing - Static: Good  Standing - Dynamic : Good  Ambulation/Gait Training:  Distance (ft): 300 Feet (ft)  Assistive Device:  (none)  Ambulation - Level of Assistance: Independent  Pain:  Pt reports 2/10 pain or discomfort prior to treatment left knee.    Pt reports 2/10 pain or discomfort post treatment left knee. Activity Tolerance:   good  Please refer to the flowsheet for vital signs taken during this treatment. After treatment:   []         Patient left in no apparent distress sitting up in chair  [x]         Patient left in no apparent distress in bed  [x]         Call bell left within reach  []         Nursing notified  []         Caregiver present  []         Bed alarm activated    COMMUNICATION/EDUCATION:   [x]         Fall prevention education was provided and the patient/caregiver indicated understanding.   [x]         Patient/family have participated as able in goal setting and plan of care.-eval only  []         Patient/family agree to work toward stated goals and plan of care. []         Patient understands intent and goals of therapy, but is neutral about his/her participation. []         Patient is unable to participate in goal setting and plan of care.   Educated patient on increasing activity throughout the day as tolerated    Thank you for this referral.  Jamal Rousseau, PT   Time Calculation: 10 mins

## 2018-03-28 NOTE — PROGRESS NOTES
Bedside and Verbal shift change report given to MATT Herrera (oncoming nurse) by Shruti Levine RN (offgoing nurse). Report included the following information SBAR, Kardex, Intake/Output, MAR and Recent Results. Dual NIH performed.

## 2018-03-28 NOTE — PROGRESS NOTES
conducted an initial consultation and Spiritual Assessment for [facility], who is a 59 y.o.,male. Patients Primary Language is: [place]. According to the patients EMR Congregation Affiliation is: [employer]ism. The reason the Patient came to the hospital is:   Patient Active Problem List    Diagnosis Date Noted    TIA (transient ischemic attack) 03/27/2018    S/P CABG x 3 02/02/2016    NSTEMI (non-ST elevated myocardial infarction) ([facility] [address] 01/12/2016    Left inguinal hernia 01/16/2015    Back pain 11/02/2011    GI bleed 11/02/2011        The  provided the following Interventions:  Initiated a relationship of care and support with patient in room 2117 this morning. Found patient lying flat in the bed and listened as he talked about his being here and his hopes for the future. Says he is doing ok at present just waiting for some test to be done later. Provided information about Spiritual Care Services. Offered prayer and assurance of continued prayers on patients behalf. The following outcomes were achieved:  Patient shared limited information about his medical narrative and spiritual journey/beliefs. Patient processed feeling about current hospitalization. Patient expressed gratitude for pastoral care visit. Assessment:  Patient does not have any Jehovah's witness/cultural needs that will affect patients preferences in health care. There are no further spiritual or Jehovah's witness issues which require Spiritual Care Services interventions at this time. Plan:  Chaplains will continue to follow and will provide pastoral care on an as needed/requested basis    . Chaz Pal   Spiritual Care   [phone]

## 2018-03-28 NOTE — PROGRESS NOTES
Bedside and Verbal shift change report given to GUERLINE NIEVES RN (oncoming nurse) by Denise Zee RN (offgoing nurse). Report included the following information SBAR, Kardex, MAR and Cardiac Rhythm NSR. Assessment completed. Dual NIH completed. AAOX4, calm and cooperative. Bed locked and at lowest position. Call bell within reach. Patient resting in bed. 2055- Patient c/o of headache and chest pain 4/10. Described chest pain as \"tightness\". Paged doctor. Temp: 97.6 oral, HR 63, RR:18, BP: 118/79 SPO2: 95%. Patient stated , \"I got like this before because of the smoking. Doctor gave me nitro tabs when I had chest pain\". This RN asked the patient how many packs of cigarettes patient smoke a day. Patient stated a half a pack. Patient continued to talk with brother at bedside. 2113- Notified Dr. Leydi Alcantara in regards to patient's complaint of chest pain, headache and smoking habit. Doctor ordered a STAT EKG, cardiac enzyme panel x2 q.6, Nitro sublingual 0.4mg q.5min max dose 3, Nicotine patch 14mg transdermal daily. trb    2126-  NIH completed patient scoring \"0\" BP: 78/40 HR: 67 RR: 16 SPO2: 95%. Pt c/o feeling sleepy. Rechecked BP 81/44. Patient denies complaint of SOB, dizziness, N/V/D, and/or pain. Page doctor. Sim Olson  Notified Dr. Leydi Alcantara in regards to patient blood pressure and complaint of feeling\"sleepy\". Doctor order 500 bolus NSS IV stat.  trb    0118- 500 ml/hr NSS IV bolus completed. Temp: 98.1 HR: 51 RR:16 BP: 85/54, SpO2: 94% RA. Denies pain dizziness, SOB, N/V/D. Will notify doctor    Rashi Ovalles-  Notified Dr. Leydi Alcantara in regards to patient's vital signs and assessment. Doctor order another 500 ml/hr NSS IV bolus. trb    0214-  500 ml/hr NSS IV bolus completed. Temp: 98.1 HR: 52 RR: 18 BP: 96/62 SPO2 93% RA. Patient resting comfortably. Will notify doctor. Kiko Jordan Notified Dr. Leydi Alcantara in regards to patient's blood pressure and current condition.   Doctor ordered 100ml/hr NSS continuous and to hold future BP medication. trb    0745- Bedside and Verbal shift change report given to MATT Newman (oncoming nurse) by Kameron Anderson RN (offgoing nurse). Report included the following information SBAR, Kardex and MAR. Dual NIH completed. Patient resting in bed.

## 2018-03-28 NOTE — PROGRESS NOTES
[facility] [address]   Discharge Planning/ Assessment    Reasons for Intervention: Interviewed patient. Verified demographics listed on face sheet with patient; all information correct. Patient stated their PCP is Dr. Popeye Finn last seen a month ago . Patient lives with his sister, Yaya George. Patient independent with ADLs prior to admission. No use of DME prior to admission. Discharge plan is home.        High Risk Criteria  [x] Yes  []No   Physician Referral  [] Yes  [x]No        Date    Nursing Referral  [] Yes  [x]No        Date    Patient/Family Request  [] Yes  [x]No        Date       Resources:    Medicare  [] Yes  [x]No   Medicaid  [x] Yes  []No   No Resources  [] Yes  [x]No   Private Insurance  [] Yes  [x]No    Name/Phone Number    Other  [] Yes  [x]No        (i.e. Workman's Comp)         Prior Services:    Prior Services  [] Yes  [x]No   Home Health  [] Yes  [x]No   [address] [place]  [] Yes  [x]No        [facility] [address] Program  [] Yes  [x]No       Meals on Wheels  [] Yes  [x]No   Office on Aging  [] Yes  [x]No   Transportation Services  [] Yes  [x]No   Nursing Home  [] Yes  [x]No        Nursing Home Name    [facility]  [] Yes  [x]No        [address] Name    Other       Information Source:      Information obtained from  [x] Patient  [] Parent   [] [address]  [] Child  [] Spouse   [] Significant Other/Partner   [] Friend      [] EMS    [] Nursing Home Chart          [] Other:   Chart Review  [x] Yes  []No     Family/Support System:    Patient lives with  [] Alone    [] Spouse   [] Significant Other  [] Children  [] Caretaker   [] Parent  [x] Sibling     [] Other       Other Support System:    Is the patient responsible for care of others  [] Yes  [x]No   Information of person caring for patient on  discharge    Managers financial affairs independently  [x] Yes  []No   If no, explain:      Status Prior to Admission:    Mental Status  [x] Awake  [x] Alert  [x] Oriented  [] Quiet/Calm [] Lethargic/Sedated   [] Disoriented  [] Restless/Anxious  [] Combative   Personal Care  [] Dependent  [x] Independent Personal Care  [] Requires Assistance   Meal Preparation Ability  [x] Independent   [] Standby Assistance   [] Minimal Assistance   [] Moderate Assistance  [] Maximum Assistance     [] Total Assistance   Chores  [x] Independent with Chores   [] N/A Nursing Home Resident   [] Requires Assistance   Bowel/Bladder  [x] Continent  [] Catheter  [] Incontinent  [] Ostomy Self-Care    [] Urine Diversion Self-Care  [] Maximum Assistance     [] Total Assistance   Number of Persons needed for assistance    DME at home  [] Marshell Hurst, Bernabe Lanes  [] Mervin Kennedy   [] Commode    [] Bathroom/Grab Bars  [] Hospital Bed  [] Nebulizer  [] Oxygen           [] Raised Toilet Seat  [] Shower Chair  [] Side Rails for Bed   [] Tub Transfer Bench   [] Poly Alvarado  [] Kenneth Jackson      [] Other:   Vendor      Treatment Presently Receiving:    Current Treatments  [] Chemotherapy  [] Dialysis  [] Insulin  [] IVAB [] IVF   [] O2  [] PCA   [] PT   [] RT   [] Tube Feedings   [] Wound Care     Psychosocial Evaluation:    Verbalized Knowledge of Disease Process  [x] Patient  []Family   Coping with Disease Process  [x] Patient  []Family   Requires Further Counseling Coping with Disease Process  [] Patient  []Family     Identified Projected Needs:    Home Health Aid  [] Yes  [x]No   Transportation  [] Yes  [x]No   Education  [] Yes  [x]No        Specific Education     Financial Counseling  [] Yes  [x]No   Inability to Care for Self/Will Require 24 hour care  [] Yes  [x]No   Pain Management  [] Yes  [x]No   Home Infusion Therapy  [] Yes  [x]No   Oxygen Therapy  [] Yes  [x]No   DME  [] Yes  [x]No   Long Term Care Placement  [] Yes  [x]No   Rehab  [] Yes  [x]No   Physical Therapy  [] Yes  [x]No   Needs Anticipated At This Time  [x] Yes  []No     Intra-Hospital Referral:    Home Health Liasion  [] Yes  [x]No     [] Yes  [x]No   Patient Representative  [] Yes  [x]No   Staff for Teaching Needs  [] Yes  [x]No   Specialty Teaching Needs     Diabetic Educator  [] Yes  [x]No   Referral for Diabetic Educator Needed  [] Yes  [x]No  If Yes, place order for Nutritionist or Diabetic Consult     Tentative Discharge Plan:    Home with No Services  [x] Yes  []No   Home with [address]  [] Yes  [x]No        If Yes, specify type    Home Care Program  [] Yes  [x]No        If Yes, specify type    Meals on Wheels  [] Yes  [x]No   Office of Aging  [] Yes  [x]No   NHP  [] Yes  [x]No   Return to the Nursing Home  [] Yes  [x]No   Rehab Therapy  [] Yes  [x]No   Acute Rehab  [] Yes  [x]No   Subacute Rehab  [] Yes  [x]No   Private Care  [] Yes  [x]No   Substance Abuse Referral  [] Yes  [x]No   Transportation  [] Yes  [x]No   Chore Service  [] Yes  [x]No   Inpatient Hospice  [] Yes  [x]No   OP RT  [] Yes  [x] No   OP Hemo  [] Yes  [x] No   OP PT  [] Yes  [x]No   Support Group  [] Yes  [x]No   Reach to Recovery  [] Yes  [x]No   OP Oncology Clinic  [] Yes  [x]No   Clinic Appointment  [] Yes  [x]No   DME  [] Yes  [x]No   Comments    Name of D/C Planner or  Given to Patient or Family Rain Torres RN BSN   Phone Number         Extension    Date 3/28/18   Time    If you are discharged home, whom do you designate to participate in your discharge plan and receive any information needed?      Enter name of Vj Alexander  [phone]        Phone # of designee         Address of designee         Updated         Patient refused to designate any           individual

## 2018-03-28 NOTE — PROGRESS NOTES
Assumed care of pt from GUERLINE NIEVES [facility] pt awake in bed A&O x 4 , no distress noted and denies pain. Frequently used items and call bell within reach. Patient verbalized understanding to use call bell for any needs or assistance. Bed locked in lowest position. Dual NIH performed.

## 2018-03-28 NOTE — PROGRESS NOTES
Problem: Falls - Risk of  Goal: *Absence of Falls  Document Andrea Fall Risk and appropriate interventions in the flowsheet.    Outcome: Progressing Towards Goal  Fall Risk Interventions:            Medication Interventions: Evaluate medications/consider consulting pharmacy

## 2018-03-28 NOTE — CONSULTS
Neurology Consult Note    Admit Date: 3/27/2018  Length of Stay: 0  Primary Care: Mark Robertson MD   Principle Problem: TIA (transient ischemic attack)     I reviewed his history, lab, diagnostic study and head CT and examed him today. I discussed about and formed the following assessment and plan with MS Muhammad. Ludivina Alonzo MD        Assessment:    TIA versus Migraine    Plan:    TIA/Stroke   Work-up in progress. MRI/A has been performed and pending. ECHO Pending. ASA 81mg + Plavix 75mg daily- was on both PTA  .2- Atorvastatin 80mg in place  THC +- Cessation    Nothing acute on CT. He had episodes of hypotension during night. Coincides soon after nitro and metoprolol  given. May need adjustments- defer to IM. Migraines  Has history of migraines and was on Topimax 50mg BID for prevention. While the HA he had on admission was different than his usual migraines he may have had a complicated migraine this time. Will resume Topamax. With the irregular dosing, he can have rebound headaches. Thank-you for this consult. We will follow along with you. History: Aleksandra Huston is a 61yo  male with PMH of CAD s/p CABG x 4, HTN, HLD, chronic pain, tobacco abuse, GI Bleed, medication noncompliance who presents to the emergency department for evaluation of headaches, chest pain, left leg numbness/weakness, and left facial heaviness since 1030 or 11am 3/27/18. He tried NGT but denies improvements. He had headaches off and on for a few days. Left sided weakness started about 4.5 hours PTA. Code S was called and he was seen by teleneurology. The headache was on the left temporal area and he describes as 10/10, sharp, and throbbing. He states he was taking his topamax then but admits that he takes the medication irregularly and ran out 2 days ago. The numbness started > 4.5 hours before coming to the ED along with the left facial \"swelling\".   Numbness was more to his leg than arm. Results reviewed:     CT Results (most recent):    Results from Hospital Encounter encounter on 03/27/18   CT HEAD WO CONT   Narrative EXAM: CT head    INDICATION: Left-sided facial arm and leg weakness, CODE S.    COMPARISON: 1/20/2016. TECHNIQUE: Axial CT imaging of the head  was obtained from skull base to vertex  without intravenous contrast. Coronal and sagittal reconstructions were  obtained. One or more dose reduction techniques were used on this CT: automated exposure  control, adjustment of the mAs and/or kVp according to patient's size, and  iterative reconstruction techniques. The specific techniques utilized on this CT  exam have been documented in the patient's electronic medical record.    _______________    FINDINGS:    BRAIN AND POSTERIOR FOSSA: The sulci, folia, ventricles and basal cisterns are  within normal limits for the patient?s age. There is no intracranial hemorrhage,  mass effect, or shift of midline structures. There are no areas of abnormal  parenchymal attenuation. EXTRA-AXIAL SPACES AND MENINGES: There are no abnormal extra-axial fluid  collections. CALVARIUM: No acute osseous abnormality. SINUSES: The visualized portions of the paranasal sinuses and mastoid air cells  are well aerated. OTHER: None.    _______________         Impression IMPRESSION:    1. No acute or other significant intracranial abnormalities are identified. No CT evidence to suggest acute intracranial hematoma, cortical infarct, or mass  effect/mass lesion. CODE S report called to physician assistant Dalia Domingo at  3:08 PM.        MRI Results (most recent):  No results found for this or any previous visit.     Latest Hemoglobin A1C:  Lab Results   Component Value Date/Time    Hemoglobin A1c 5.9 01/15/2016 01:55 PM       Latest Cardiology Procedure:  Results for orders placed or performed during the hospital encounter of 03/27/18   EKG, 12 LEAD, INITIAL   Result Value Ref Range    Ventricular Rate 51 BPM    Atrial Rate 51 BPM    P-R Interval 116 ms    QRS Duration 66 ms    Q-T Interval 410 ms    QTC Calculation (Bezet) 377 ms    Calculated P Axis 52 degrees    Calculated R Axis 52 degrees    Calculated T Axis 48 degrees    Diagnosis       Sinus bradycardia  Nonspecific ST and T wave abnormality  Abnormal ECG  When compared with ECG of 02-FEB-2016 12:48,  T wave inversion no longer evident in Inferior leads  T wave inversion no longer evident in Anterolateral leads  Confirmed by Mitra Garcia ([id number]) on 3/27/2018 8:47:15 PM         Important Labs:  No results found for: FOL, RBCF  Lab Results   Component Value Date/Time    Cholesterol, total 220 (H) 03/27/2018 03:10 PM    HDL Cholesterol 38 (L) 03/27/2018 03:10 PM    LDL, calculated 151.2 (H) 03/27/2018 03:10 PM    VLDL, calculated 30.8 03/27/2018 03:10 PM    Triglyceride 154 (H) 03/27/2018 03:10 PM    CHOL/HDL Ratio 5.8 (H) 03/27/2018 03:10 PM     Lab Results   Component Value Date/Time    TSH 1.33 03/27/2018 03:10 PM     No results found for: DS35, PHEN, PHENO, PHENT, DILF, DS39, PHENY, PTN, VALF2, VALAC, VALP, VALPR, DS6, CRBAM, CRBAMP, CARB2, XCRBAM  Discussed with: patient    Allergies:    Allergies   Allergen Reactions    Aspirin Other (comments)     Stomach bleed      Review of Systems:    Y  N   Constitutional: [] [] recent weight change  [] [x] fever  [] [x] sleep difficulties   ENT/Mouth:  [] [] hearing loss  [] [x] swallowing problems  [] [x] slurred speech   Cardiovascular:  [x] [] chest pain  Baseline and intermittent  [] [x] palpitations    Respiratory: [] [x] cough with swallow  [x] [] shortness of breath  [] [] sleep apnea  [] [] intubated   Gastrointestinal: [] [x] abdominal pain  [] [x] nausea   Genitourinary: [] [] frequent urination  [] [x] incontinence    Musculoskeletal:   [] [x] joint pain  [] [x] muscle pain   Integument:   [] [] rash/itching   Neurological:  [] [] dizziness/vertigo  [] [] sedation  [] [] confusion  [] [] agitation/combativeness  [] [] loss of consciousness  [x] [] numbness/tingling sensation  [] [x] tremors  [x] [] weakness in limbs  [] [] difficulty with balance  [x] [] frequent or recurring headaches  [] [] memory loss   [] [] comatose  [] [x] seizures   Psychiatric:   [] [] depression  [] [] hallucinations   Endocrine: [] [] excessive thirst or urination   [] [] heat or cold intolerance   Hematologic/Lymphatic: [] [] bleeding tendency  [] [] enlarged lymph nodes     PMH:   Past Medical History:   Diagnosis Date    CAD (coronary artery disease)     Chronic pain     GI bleed 11/2/2011    Hernia     HLD (hyperlipidemia)     HTN (hypertension)     Non compliance w medication regimen     S/P CABG x 3 01/16    LIMA to LAD, SVG to PDA and OM1    Tobacco abuse         Problem List: Principal Problem:    TIA (transient ischemic attack) (3/27/2018)        FH:   Family History   Problem Relation Age of Onset    Diabetes Sister         SH:   Social History     Social History    Marital status:      Spouse name: N/A    Number of children: N/A    Years of education: N/A     Social History Main Topics    Smoking status: Current Some Day Smoker     Packs/day: 0.50     Types: Cigarettes     Last attempt to quit: 1/16/2016    Smokeless tobacco: Never Used    Alcohol use No    Drug use: No    Sexual activity: Not Currently     Other Topics Concern    None     Social History Narrative          Vital Signs:   Visit Vitals    /62 (BP 1 Location: Right arm, BP Patient Position: At rest)    Pulse 60    Temp 98.1 °F (36.7 °C)    Resp 17    Ht 5' 1\" (1.549 m)    Wt 65.8 kg (145 lb)    SpO2 96%    BMI 27.4 kg/m2      Neurological examination:    Appearance: In no acute distress, well developed, well nourished, cooperative   Cardiovascular: Heart is regular rate and rhythm, peripheral edema is absent. No carotid bruits heard   Mental status examination: Alert and oriented X 4. Normal speech and language. Normal attention, memory and fund of knowledge.  Cranial Nerves:     I: smell Not tested   II: visual fields Visual fields were grossly intact to confrontation  Eye movements were full and conjugate, saccades were accurate, pursuit movements were smooth, and there was no nystagmus. II: pupils Equal, round, reactive to light   III,VII: ptosis none   III,IV,VI: extraocular muscles  Full ROM   V: facial light touch sensation  normal   V,VII: corneal reflex     VII: facial muscle function  normal   VIII: hearing    IX: soft palate elevation  Normal. The palate and tongue moved in the midline. IX,X: gag reflex present   XI: sternocleidomastoid strength 5/5   XII: tongue strength  Normal.          Motor exam: Station, gait:  normaldeferred. Muscle tone, bulk and manual muscle testing: normal. Spacticity absent.  Sensory: Decreased light touch to left side.  Coordination: Normal rapid alternating movements, finger to nose testing.  Reflexes: Symmetric and 1+ in bilateral biceps, triceps, brachioradialis, patellar, ankle reflexes. Plantar reflexes are flexor.            Medications:      [x] REVIEWED  Current Facility-Administered Medications   Medication    aspirin delayed-release tablet 81 mg    atorvastatin (LIPITOR) tablet 80 mg    cetirizine (ZYRTEC) tablet 10 mg    clopidogrel (PLAVIX) tablet 75 mg    escitalopram oxalate (LEXAPRO) tablet 20 mg    furosemide (LASIX) tablet 20 mg    gabapentin (NEURONTIN) capsule 300 mg    metoprolol tartrate (LOPRESSOR) tablet 12.5 mg    risperiDONE (RisperDAL) tablet 2 mg    ondansetron (ZOFRAN) injection 2 mg    acetaminophen (TYLENOL) tablet 650 mg    senna-docusate (PERICOLACE) 8.6-50 mg per tablet 2 Tab    albuterol (PROVENTIL VENTOLIN) nebulizer solution 2.5 mg    folic acid (FOLVITE) 1 mg, thiamine (B-1) 100 mg in 0.9% sodium chloride 50 mL ivpb    heparin (porcine) injection 5,000 Units    nitroglycerin (NITROSTAT) tablet 0.4 mg    nicotine (NICODERM CQ) 14 mg/24 hr patch 1 Patch     Data:      [x] REVIEWED  Recent Results (from the past 24 hour(s))   GLUCOSE, POC    Collection Time: 03/27/18  3:07 PM   Result Value Ref Range    Glucose (POC) 91 70 - 110 mg/dL   CBC WITH AUTOMATED DIFF    Collection Time: 03/27/18  3:10 PM   Result Value Ref Range    WBC 8.3 4.6 - 13.2 K/uL    RBC 5.34 4.70 - 5.50 M/uL    HGB 17.2 (H) 13.0 - 16.0 g/dL    HCT 51.0 (H) 36.0 - 48.0 %    MCV 95.5 74.0 - 97.0 FL    MCH 32.2 24.0 - 34.0 PG    MCHC 33.7 31.0 - 37.0 g/dL    RDW 13.1 11.6 - 14.5 %    PLATELET 870 309 - 679 K/uL    MPV 10.4 9.2 - 11.8 FL    NEUTROPHILS 48 40 - 73 %    LYMPHOCYTES 44 21 - 52 %    MONOCYTES 7 3 - 10 %    EOSINOPHILS 1 0 - 5 %    BASOPHILS 0 0 - 2 %    ABS. NEUTROPHILS 4.1 1.8 - 8.0 K/UL    ABS. LYMPHOCYTES 3.6 0.9 - 3.6 K/UL    ABS. MONOCYTES 0.5 0.05 - 1.2 K/UL    ABS. EOSINOPHILS 0.0 0.0 - 0.4 K/UL    ABS. BASOPHILS 0.0 0.0 - 0.06 K/UL    DF AUTOMATED     METABOLIC PANEL, COMPREHENSIVE    Collection Time: 03/27/18  3:10 PM   Result Value Ref Range    Sodium 139 136 - 145 mmol/L    Potassium 4.4 3.5 - 5.5 mmol/L    Chloride 108 100 - 108 mmol/L    CO2 27 21 - 32 mmol/L    Anion gap 4 3.0 - 18 mmol/L    Glucose 86 74 - 99 mg/dL    BUN 16 7.0 - 18 MG/DL    Creatinine 1.04 0.6 - 1.3 MG/DL    BUN/Creatinine ratio 15 12 - 20      GFR est AA >60 >60 ml/min/1.73m2    GFR est non-AA >60 >60 ml/min/1.73m2    Calcium 8.7 8.5 - 10.1 MG/DL    Bilirubin, total 0.4 0.2 - 1.0 MG/DL    ALT (SGPT) 19 16 - 61 U/L    AST (SGOT) 12 (L) 15 - 37 U/L    Alk.  phosphatase 98 45 - 117 U/L    Protein, total 7.0 6.4 - 8.2 g/dL    Albumin 3.7 3.4 - 5.0 g/dL    Globulin 3.3 2.0 - 4.0 g/dL    A-G Ratio 1.1 0.8 - 1.7     MAGNESIUM    Collection Time: 03/27/18  3:10 PM   Result Value Ref Range    Magnesium 2.4 1.6 - 2.6 mg/dL   PROTHROMBIN TIME + INR    Collection Time: 03/27/18  3:10 PM   Result Value Ref Range    Prothrombin time 12.8 11.5 - 15.2 sec INR 1.0 0.8 - 1.2     CARDIAC PANEL,(CK, CKMB & TROPONIN)    Collection Time: 03/27/18  3:10 PM   Result Value Ref Range    CK 88 39 - 308 U/L    CK - MB 1.8 <3.6 ng/ml    CK-MB Index 2.0 0.0 - 4.0 %    Troponin-I, Qt. <0.02 0.0 - 0.045 NG/ML   THROMBIN TIME    Collection Time: 03/27/18  3:10 PM   Result Value Ref Range    Thrombin time 16.0 13.8 - 18.2 SECS   FIBRINOGEN    Collection Time: 03/27/18  3:10 PM   Result Value Ref Range    Fibrinogen 453 (H) 210 - 451 mg/dL   TYPE & SCREEN    Collection Time: 03/27/18  3:10 PM   Result Value Ref Range    Crossmatch Expiration 03/30/2018     ABO/Rh(D) Georgana Kitty NEGATIVE     Antibody screen NEG    ASPIRIN TEST    Collection Time: 03/27/18  3:10 PM   Result Value Ref Range    Aspirin test 556 (L) 620 - 672 ARU   LIPID PANEL    Collection Time: 03/27/18  3:10 PM   Result Value Ref Range    LIPID PROFILE          Cholesterol, total 220 (H) <200 MG/DL    Triglyceride 154 (H) <150 MG/DL    HDL Cholesterol 38 (L) 40 - 60 MG/DL    LDL, calculated 151.2 (H) 0 - 100 MG/DL    VLDL, calculated 30.8 MG/DL    CHOL/HDL Ratio 5.8 (H) 0 - 5.0     TSH 3RD GENERATION    Collection Time: 03/27/18  3:10 PM   Result Value Ref Range    TSH 1.33 0.36 - 3.74 uIU/mL   EKG, 12 LEAD, INITIAL    Collection Time: 03/27/18  3:11 PM   Result Value Ref Range    Ventricular Rate 51 BPM    Atrial Rate 51 BPM    P-R Interval 116 ms    QRS Duration 66 ms    Q-T Interval 410 ms    QTC Calculation (Bezet) 377 ms    Calculated P Axis 52 degrees    Calculated R Axis 52 degrees    Calculated T Axis 48 degrees    Diagnosis       Sinus bradycardia  Nonspecific ST and T wave abnormality  Abnormal ECG  When compared with ECG of 02-FEB-2016 12:48,  T wave inversion no longer evident in Inferior leads  T wave inversion no longer evident in Anterolateral leads  Confirmed by Carlito Sanchez ([id number]) on 3/27/2018 8:47:15 PM     URINALYSIS W/ RFLX MICROSCOPIC    Collection Time: 03/27/18  4:23 PM   Result Value Ref Range Color YELLOW      Appearance CLEAR      Specific gravity 1.020 1.005 - 1.030      pH (UA) 5.0 5.0 - 8.0      Protein NEGATIVE  NEG mg/dL    Glucose NEGATIVE  NEG mg/dL    Ketone NEGATIVE  NEG mg/dL    Bilirubin NEGATIVE  NEG      Blood NEGATIVE  NEG      Urobilinogen 0.2 0.2 - 1.0 EU/dL    Nitrites NEGATIVE  NEG      Leukocyte Esterase NEGATIVE  NEG     DRUG SCREEN, URINE    Collection Time: 03/27/18  4:23 PM   Result Value Ref Range    BENZODIAZEPINES NEGATIVE  NEG      BARBITURATES NEGATIVE  NEG      THC (TH-CANNABINOL) POSITIVE (A) NEG      OPIATES NEGATIVE  NEG      PCP(PHENCYCLIDINE) NEGATIVE  NEG      COCAINE NEGATIVE  NEG      AMPHETAMINES NEGATIVE  NEG      METHADONE NEGATIVE  NEG      HDSCOM (NOTE)    GLUCOSE, POC    Collection Time: 03/27/18  6:27 PM   Result Value Ref Range    Glucose (POC) 80 70 - 110 mg/dL   GLUCOSE, POC    Collection Time: 03/27/18  8:55 PM   Result Value Ref Range    Glucose (POC) 154 (H) 70 - 110 mg/dL   EKG, 12 LEAD, SUBSEQUENT    Collection Time: 03/27/18  9:24 PM   Result Value Ref Range    Ventricular Rate 60 BPM    Atrial Rate 60 BPM    P-R Interval 114 ms    QRS Duration 68 ms    Q-T Interval 398 ms    QTC Calculation (Bezet) 398 ms    Calculated P Axis 73 degrees    Calculated R Axis 77 degrees    Calculated T Axis 9 degrees    Diagnosis       Normal sinus rhythm  Possible Left atrial enlargement  Nonspecific T wave abnormality  Abnormal ECG  When compared with ECG of 27-MAR-2018 15:11,  Nonspecific T wave abnormality, worse in Inferior leads  Nonspecific T wave abnormality, improved in Lateral leads  Confirmed by Sherry Rodriguez ([id number]) on 3/28/2018 10:02:20 AM     CARDIAC PANEL,(CK, CKMB & TROPONIN)    Collection Time: 03/27/18 10:50 PM   Result Value Ref Range    CK 68 39 - 308 U/L    CK - MB 1.2 <3.6 ng/ml    CK-MB Index 1.8 0.0 - 4.0 %    Troponin-I, Qt. <0.02 0.0 - 0.045 NG/ML   CARDIAC PANEL,(CK, CKMB & TROPONIN)    Collection Time: 03/28/18  5:30 AM   Result Value Ref Range    CK 55 39 - 308 U/L    CK - MB 1.5 <3.6 ng/ml    CK-MB Index 2.7 0.0 - 4.0 %    Troponin-I, Qt. <0.02 0.0 - 0.045 NG/ML   GLUCOSE, POC    Collection Time: 03/28/18  8:10 AM   Result Value Ref Range    Glucose (POC) 92 70 - 110 mg/dL   GLUCOSE, POC    Collection Time: 03/28/18 11:49 AM   Result Value Ref Range    Glucose (POC) 108 70 - 110 mg/dL       Janessa Dumont NP

## 2018-03-28 NOTE — H&P
[facility]  HISTORY [facility]YS[facility]      Name:SKIFFDelfino Boll  MR#: [id number]  : 1960  ACCOUNT #: [de-identified]   ADMIT DATE: 2018    REASON FOR ADMISSION:  One day history of numbness in the left extremities and face. HISTORY OF PRESENT ILLNESS:  The patient is a pleasant 49-year-old male with past medical history significant for coronary artery disease with history of CABG, hypertension, hyperlipidemia, chronic back pain with some neuropathy, tobacco abuse, presents to the ER with a one day history with left-sided facial numbness and left arm and leg numbness without any significant weakness. The patient generally felt weak after having bypass in 2016, but denies any focal weakness. At the time of my evaluation, he was alert, awake, oriented, stated that he has facial numbness has resolved. His left arm and left leg weakness are also improving. He denies any chest pain, shortness of breath or palpitations. PAST MEDICAL HISTORY:  Hypertension, coronary artery disease, hyperlipidemia, osteoarthritis with chronic back pain. PAST SURGICAL HISTORY:  CABG in 2016. The patient has had two hernia repairs. SOCIAL HISTORY:  He is , has three children. He continues to smoke, denies alcohol abuse. FAMILY HISTORY:  Positive for coronary artery disease. ALLERGIES:  ASPIRIN PER CHART, HOWEVER, THE PATIENT IS ABLE TO TOLERATE ASPIRIN. CODE STATUS:  FULL. MEDICATIONS:  See attached list.    REVIEW OF SYSTEMS:  No fever or chills, no weight loss or headache, no neck pain or sore throat, positive for left-sided facial numbness. No chest pain or palpitations. No shortness of breath, no cough. No nausea, vomiting, diarrhea. No dysuria or polyuria. Positive for back pain and occasional leg pain, positive for left arm and leg numbness but no weakness. No heat or cold intolerance.     PHYSICAL EXAMINATION:  GENERAL:  This is a well-nourished, well-developed male in no apparent distress. VITAL SIGNS:  Temperature 97.5, heart rate 52, blood pressure 115/80, satting 98%. HEENT:  Normocephalic, atraumatic. Sclerae are anicteric. Oropharynx is clear, mucous membranes moist.  NECK:  No JVD or thyromegaly. HEART:  S1, S2, regular rate and rhythm. LUNGS:  Decreased breath sounds bilaterally. No wheezing. ABDOMEN:  Nontender, nondistended, normoactive bowel sounds. EXTREMITIES:  No clubbing or edema. NEUROLOGIC:  Motor strength is 5/5 in all four extremities. Cranial nerves are intact. Speech is within normal limits. ASSESSMENT AND PLAN:  1. Transient ischemic attack, rule out cerebrovascular accident. The patient will be admitted to the hospital.  Continue with aspirin, Plavix and statin. Initial CT did not show any acute changes. Patient will have MRI/MRA and 2D echocardiogram.  Check lipid status. 2.  Hypertension. Resume outpatient medication. 3.  Hyperlipidemia. 4.  Chronic back pain, with neuropathy. 5.  Smoking cessation discussed with the patient. 6.  Heparin for deep venous thrombosis prophylaxis. 7.  Code status:  Code status is full.       Nigel MD RYLEE Cooper/[place]  D: 03/27/2018 19:14     T: 03/27/2018 20:01  JOB #: [id number]

## 2018-03-28 NOTE — PROGRESS NOTES
Nutrition initial assessment/Plan of care      RECOMMENDATIONS:   1. Cardiac Diet  2. Monitor weight, labs and PO intake  3. RD to follow     GOALS:   1. PO intake meets >75% of protein/calorie needs by 4/4  2. Weight Maintenance/gradual loss (1-2 lb) by 4/4     ASSESSMENT:   Wt status is classified as overweight per BMI of 27.4. Adequate energy intake per vitals. Labs noted. BG range () over the past 24 hours. Pt w/ elevated triglycerides (154) and LDL (151.2). Nutrition recommendations listed. RD to follow. Nutrition Diagnoses: Altered nutrition related lab values related to TIA as evidenced by elevated triglycerides (154) and LDL (151.2). Overweight related to excerssive energy intake PTA as evidenced by BMI of 27.4 and 129% IBW. Nutrition Risk:  [] High  [] Moderate [x]  Low    SUBJECTIVE/OBJECTIVE:    Pt admitted for TIA. PMHx including CAD s/p CABG, hypertension, hyperlipidemia, chronic back pain with some neuropathy and tobacco abuse. Pt seen in room resting. Denies having any food allergies or problems chewing /swallowing; -145 lb. Report having a good appetite and does not follow any type of diet at home. Discussed some nutrition recommendations and provided handouts. Pt did not seem to be very receptive at this time, but could just be drowsy. Will monitor. Information Obtained from:    [x] Chart Review   [x] Patient   [] Family/Caregiver   [] Nurse/Physician   [] Interdisciplinary Meeting/Rounds      Diet: Cardiac Diet  Medications: [x] Reviewed    Lipitor, Plavix, Lasix 20 mg/d  Allergies: [x] Reviewed   Encounter Diagnoses     ICD-10-CM ICD-9-CM   1. Nonintractable headache, unspecified chronicity pattern, unspecified headache type R51 784.0   2. Left-sided weakness R53.1 728.87   3. Left sided numbness R20.0 782.0   4.  Chest pain, unspecified type R07.9 786.50     Past Medical History:   Diagnosis Date    CAD (coronary artery disease)     Chronic pain     GI bleed 11/2/2011    Hernia     HLD (hyperlipidemia)     HTN (hypertension)     Non compliance w medication regimen     S/P CABG x 3 01/16    LIMA to LAD, SVG to PDA and OM1    Tobacco abuse       Labs:  Lab Results   Component Value Date/Time    Sodium 139 03/27/2018 03:10 PM    Potassium 4.4 03/27/2018 03:10 PM    Chloride 108 03/27/2018 03:10 PM    CO2 27 03/27/2018 03:10 PM    Anion gap 4 03/27/2018 03:10 PM    Glucose 86 03/27/2018 03:10 PM    BUN 16 03/27/2018 03:10 PM    Creatinine 1.04 03/27/2018 03:10 PM    Calcium 8.7 03/27/2018 03:10 PM    Magnesium 2.4 03/27/2018 03:10 PM    Phosphorus 3.4 10/20/2011 02:18 AM    Albumin 3.7 03/27/2018 03:10 PM     Anthropometrics: BMI (calculated): 27.4  Last 3 Recorded Weights in this Encounter    03/27/18 1453   Weight: 65.8 kg (145 lb)    Ht Readings from Last 1 Encounters:   03/27/18 5' 1\" (1.549 m)     Patient Vitals for the past 100 hrs:   % Diet Eaten   03/28/18 1059 100 %       IBW: 112 lb %IBW: 129% UBW: 140-145 lb   [] Weight Loss [] Weight Gain [x] Weight Stable    Estimated Nutrition Needs: [x] MSJ  [] Other:  Calories: 6549-4685 kcal Based on:   [x] Actual BW    Protein:   65-79 g Based on:   [x] Actual BW    Fluid:       7566-0873 ml Based on:   [x] Actual BW      [x] No Cultural, Roman Catholic or ethnic dietary need identified.     [] Cultural, Roman Catholic and ethnic food preferences identified and addressed     Wt Status:  [] Normal (18.6 - 24.9) [] Underweight (<18.5) [x] Overweight (25 - 29.9) [] Mild Obesity (30 - 34.9)  [] Moderate Obesity (35 - 39.9) [] Morbid Obesity (40+)       Nutrition Problems Identified:   [] Suboptimal PO intake   [] Food Allergies  [] Difficulty chewing/swallowing/poor dentition  [] Constipation/Diarrhea   [] Nausea/Vomiting   [x] None  [] Other:     Plan:   [x] Therapeutic Diet  []  Obtained/adjusted food preferences/tolerances and/or snacks options   []  Supplements added   [] Occupational therapy following for feeding techniques  []  HS snack added   []  Modify diet texture   []  Modify diet for food allergies   [x]  Educate patient   []  Assist with menu selection   [x]  Monitor PO intake on meal rounds   [x]  Continue inpatient monitoring and intervention   []  Participated in discharge planning/Interdisciplinary rounds/Team meetings   []  Other:     Education Needs:   [] Not appropriate for teaching at this time due to:   [x] Identified and addressed    Nutrition Monitoring and Evaluation:  [x] Continue ongoing monitoring and intervention  [] Other    Crys Alan

## 2018-03-28 NOTE — PROGRESS NOTES
[address] [place], [place] [address]ty [facility]  Hospitalist Division        Inpatient Daily Progress Note    Daily progress Note    Patient: Polina Workman MRN: [id number]  CSN: [id number]    YOB: 1960  Age: 59 y.o. Sex: male    DOA: 3/27/2018 LOS:  LOS: 0 days                    Chief Complaint:  TIA     Interval History: 39-year-old  male with past medical history significant for coronary artery disease with history of CABG, hypertension, hyperlipidemia, chronic back pain with some neuropathy, tobacco abuse, presents to the ER on 3/27/2018 with a one day history of left-sided facial numbness and left arm and leg numbness without any significant weakness. The patient generally felt weak after having bypass in 2016, but denies any focal weakness. Pt reports his facial numbness resolved while in the ED and left arm and left leg weakness improved. He denies any chest pain, shortness of breath or palpitations. Code S initiated in the ED, tele-neurology consulted, negative CT head, UDS + THC, UA NEG, negative troponin x 3, triglyceride 154, total cholesterol 220, negative CXR. MRI brain, MRA brain and neck pending. Neurology consulted, eval pending.       Assessment/Plan:     Patient Active Problem List   Diagnosis Code    Back pain M54.9    GI bleed K92.2    Left inguinal hernia K40.90    NSTEMI (non-ST elevated myocardial infarction) (HCC) I21.4    S/P CABG x 3 Z95.1    TIA (transient ischemic attack) G45.9       A/P:  TIA  - left side facial numbness, left side extremity numbness - symptoms improved while in ED  - tele neuro consulted in ED  - CT head 3/27/2018 negative  - MRI brain, MRA brain, MRA neck pending  - Echo pending  - ASA 81 mg daily, Lipitor 80 mg nightly, Folic Acid/Thiamine daily, pt on Plavix 75 mg daily  - PT/OT, speech   - Neuro checks  - Neuro consult    Hypertension, hx of CABG x 3  - Lopressor 12.5 mg BID    Chronic back pain w/ neuropathy    Tobacco abuse  - encourage cessation    DVT Prophylaxis  - Heparin subcutaneously TID        MAILE Gonsalves 81  Pager:  [phone]  Office:  [phone]        Subjective:      No acute events overnight. Pt reports resolution of symptoms, states he came to the ED because he thought he was having a stroke. This has never happened to him before. Denies recent stressors or changes in medication regimen. Objective:      Visit Vitals    /66    Pulse (!) 56    Temp 97.8 °F (36.6 °C)    Resp 16    Ht 5' 1\" (1.549 m)    Wt 65.8 kg (145 lb)    SpO2 95%    BMI 27.4 kg/m2           Physical Exam:  General appearance: alert, cooperative, no distress, appears stated age  Head: Normocephalic, without obvious abnormality, atraumatic  Lungs: clear to auscultation bilaterally  Heart: regular rate and rhythm, S1, S2 normal, no murmur, click, rub or gallop  Abdomen: soft, non tender, non distended. Normoactive bowel sounds.  No masses, no organomegaly  Extremities: extremities normal, atraumatic, no cyanosis or edema  Skin: Skin color, texture, turgor normal. No rashes or lesions  Neurologic: Grossly normal  PSY: Mood and affect normal, appropriately behaved        Intake and Output:  Current Shift:  03/28 0701 - 03/28 1900  In: 465 [I.V.:465]  Out: -   Last three shifts:  03/26 1901 - 03/28 0700  In: 240 [P.O.:240]  Out: 1200 [Urine:1200]    Recent Results (from the past 24 hour(s))   GLUCOSE, POC    Collection Time: 03/27/18  3:07 PM   Result Value Ref Range    Glucose (POC) 91 70 - 110 mg/dL   CBC WITH AUTOMATED DIFF    Collection Time: 03/27/18  3:10 PM   Result Value Ref Range    WBC 8.3 4.6 - 13.2 K/uL    RBC 5.34 4.70 - 5.50 M/uL    HGB 17.2 (H) 13.0 - 16.0 g/dL    HCT 51.0 (H) 36.0 - 48.0 %    MCV 95.5 74.0 - 97.0 FL    MCH 32.2 24.0 - 34.0 PG    MCHC 33.7 31.0 - 37.0 g/dL    RDW 13.1 11.6 - 14.5 %    PLATELET 973 681 - 138 K/uL    MPV 10.4 9.2 - 11.8 FL NEUTROPHILS 48 40 - 73 %    LYMPHOCYTES 44 21 - 52 %    MONOCYTES 7 3 - 10 %    EOSINOPHILS 1 0 - 5 %    BASOPHILS 0 0 - 2 %    ABS. NEUTROPHILS 4.1 1.8 - 8.0 K/UL    ABS. LYMPHOCYTES 3.6 0.9 - 3.6 K/UL    ABS. MONOCYTES 0.5 0.05 - 1.2 K/UL    ABS. EOSINOPHILS 0.0 0.0 - 0.4 K/UL    ABS. BASOPHILS 0.0 0.0 - 0.06 K/UL    DF AUTOMATED     METABOLIC PANEL, COMPREHENSIVE    Collection Time: 03/27/18  3:10 PM   Result Value Ref Range    Sodium 139 136 - 145 mmol/L    Potassium 4.4 3.5 - 5.5 mmol/L    Chloride 108 100 - 108 mmol/L    CO2 27 21 - 32 mmol/L    Anion gap 4 3.0 - 18 mmol/L    Glucose 86 74 - 99 mg/dL    BUN 16 7.0 - 18 MG/DL    Creatinine 1.04 0.6 - 1.3 MG/DL    BUN/Creatinine ratio 15 12 - 20      GFR est AA >60 >60 ml/min/1.73m2    GFR est non-AA >60 >60 ml/min/1.73m2    Calcium 8.7 8.5 - 10.1 MG/DL    Bilirubin, total 0.4 0.2 - 1.0 MG/DL    ALT (SGPT) 19 16 - 61 U/L    AST (SGOT) 12 (L) 15 - 37 U/L    Alk.  phosphatase 98 45 - 117 U/L    Protein, total 7.0 6.4 - 8.2 g/dL    Albumin 3.7 3.4 - 5.0 g/dL    Globulin 3.3 2.0 - 4.0 g/dL    A-G Ratio 1.1 0.8 - 1.7     MAGNESIUM    Collection Time: 03/27/18  3:10 PM   Result Value Ref Range    Magnesium 2.4 1.6 - 2.6 mg/dL   PROTHROMBIN TIME + INR    Collection Time: 03/27/18  3:10 PM   Result Value Ref Range    Prothrombin time 12.8 11.5 - 15.2 sec    INR 1.0 0.8 - 1.2     CARDIAC PANEL,(CK, CKMB & TROPONIN)    Collection Time: 03/27/18  3:10 PM   Result Value Ref Range    CK 88 39 - 308 U/L    CK - MB 1.8 <3.6 ng/ml    CK-MB Index 2.0 0.0 - 4.0 %    Troponin-I, Qt. <0.02 0.0 - 0.045 NG/ML   THROMBIN TIME    Collection Time: 03/27/18  3:10 PM   Result Value Ref Range    Thrombin time 16.0 13.8 - 18.2 SECS   FIBRINOGEN    Collection Time: 03/27/18  3:10 PM   Result Value Ref Range    Fibrinogen 453 (H) 210 - 451 mg/dL   TYPE & SCREEN    Collection Time: 03/27/18  3:10 PM   Result Value Ref Range    Crossmatch Expiration 03/30/2018     ABO/Rh(D) Abdi Bright NEGATIVE Antibody screen NEG    ASPIRIN TEST    Collection Time: 03/27/18  3:10 PM   Result Value Ref Range    Aspirin test 556 (L) 620 - 672 ARU   LIPID PANEL    Collection Time: 03/27/18  3:10 PM   Result Value Ref Range    LIPID PROFILE          Cholesterol, total 220 (H) <200 MG/DL    Triglyceride 154 (H) <150 MG/DL    HDL Cholesterol 38 (L) 40 - 60 MG/DL    LDL, calculated 151.2 (H) 0 - 100 MG/DL    VLDL, calculated 30.8 MG/DL    CHOL/HDL Ratio 5.8 (H) 0 - 5.0     TSH 3RD GENERATION    Collection Time: 03/27/18  3:10 PM   Result Value Ref Range    TSH 1.33 0.36 - 3.74 uIU/mL   EKG, 12 LEAD, INITIAL    Collection Time: 03/27/18  3:11 PM   Result Value Ref Range    Ventricular Rate 51 BPM    Atrial Rate 51 BPM    P-R Interval 116 ms    QRS Duration 66 ms    Q-T Interval 410 ms    QTC Calculation (Bezet) 377 ms    Calculated P Axis 52 degrees    Calculated R Axis 52 degrees    Calculated T Axis 48 degrees    Diagnosis       Sinus bradycardia  Nonspecific ST and T wave abnormality  Abnormal ECG  When compared with ECG of 02-FEB-2016 12:48,  T wave inversion no longer evident in Inferior leads  T wave inversion no longer evident in Anterolateral leads  Confirmed by Ivie Severance ([id number]) on 3/27/2018 8:47:15 PM     URINALYSIS W/ RFLX MICROSCOPIC    Collection Time: 03/27/18  4:23 PM   Result Value Ref Range    Color YELLOW      Appearance CLEAR      Specific gravity 1.020 1.005 - 1.030      pH (UA) 5.0 5.0 - 8.0      Protein NEGATIVE  NEG mg/dL    Glucose NEGATIVE  NEG mg/dL    Ketone NEGATIVE  NEG mg/dL    Bilirubin NEGATIVE  NEG      Blood NEGATIVE  NEG      Urobilinogen 0.2 0.2 - 1.0 EU/dL    Nitrites NEGATIVE  NEG      Leukocyte Esterase NEGATIVE  NEG     DRUG SCREEN, URINE    Collection Time: 03/27/18  4:23 PM   Result Value Ref Range    BENZODIAZEPINES NEGATIVE  NEG      BARBITURATES NEGATIVE  NEG      THC (TH-CANNABINOL) POSITIVE (A) NEG      OPIATES NEGATIVE  NEG      PCP(PHENCYCLIDINE) NEGATIVE  NEG      COCAINE NEGATIVE  NEG      AMPHETAMINES NEGATIVE  NEG      METHADONE NEGATIVE  NEG      HDSCOM (NOTE)    GLUCOSE, POC    Collection Time: 03/27/18  6:27 PM   Result Value Ref Range    Glucose (POC) 80 70 - 110 mg/dL   GLUCOSE, POC    Collection Time: 03/27/18  8:55 PM   Result Value Ref Range    Glucose (POC) 154 (H) 70 - 110 mg/dL   CARDIAC PANEL,(CK, CKMB & TROPONIN)    Collection Time: 03/27/18 10:50 PM   Result Value Ref Range    CK 68 39 - 308 U/L    CK - MB 1.2 <3.6 ng/ml    CK-MB Index 1.8 0.0 - 4.0 %    Troponin-I, Qt. <0.02 0.0 - 0.045 NG/ML   CARDIAC PANEL,(CK, CKMB & TROPONIN)    Collection Time: 03/28/18  5:30 AM   Result Value Ref Range    CK 55 39 - 308 U/L    CK - MB 1.5 <3.6 ng/ml    CK-MB Index 2.7 0.0 - 4.0 %    Troponin-I, Qt. <0.02 0.0 - 0.045 NG/ML           Lab Results   Component Value Date/Time    Glucose 86 03/27/2018 03:10 PM    Glucose 111 (H) 01/23/2016 03:40 AM    Glucose 145 (H) 01/21/2016 05:30 AM    Glucose 122 (H) 01/19/2016 03:20 AM    Glucose 140 (H) 01/18/2016 01:30 PM        Imaging:  Ct Head Wo Cont    Result Date: 3/27/2018  EXAM: CT head INDICATION: Left-sided facial arm and leg weakness, CODE S. COMPARISON: 1/20/2016. TECHNIQUE: Axial CT imaging of the head  was obtained from skull base to vertex without intravenous contrast. Coronal and sagittal reconstructions were obtained. One or more dose reduction techniques were used on this CT: automated exposure control, adjustment of the mAs and/or kVp according to patient's size, and iterative reconstruction techniques. The specific techniques utilized on this CT exam have been documented in the patient's electronic medical record. _______________ FINDINGS: BRAIN AND POSTERIOR FOSSA: The sulci, folia, ventricles and basal cisterns are within normal limits for the patient?s age. There is no intracranial hemorrhage, mass effect, or shift of midline structures. There are no areas of abnormal parenchymal attenuation.  EXTRA-AXIAL SPACES AND MENINGES: There are no abnormal extra-axial fluid collections. CALVARIUM: No acute osseous abnormality. SINUSES: The visualized portions of the paranasal sinuses and mastoid air cells are well aerated. OTHER: None. _______________     IMPRESSION: 1. No acute or other significant intracranial abnormalities are identified. No CT evidence to suggest acute intracranial hematoma, cortical infarct, or mass effect/mass lesion. CODE S report called to physician assistant Olga Sensor at 3:08 PM.     Xr Chest Port    Result Date: 3/27/2018  Chest, single view Indication: Headaches, chest pain. Comparison: Several prior chest radiograph, most recently 2/2/2016 Findings:  Portable upright AP view of the chest was obtained. The cardiomediastinal silhouette is stable, with post op changes of median sternotomy. The pulmonary vasculature is unremarkable. Lung parenchyma is well aerated, without focal consolidation. No pleural effusion nor pneumothorax. No acute osseous abnormality. Impression: No radiographic evidence of an acute abnormality.       Medication List Reviewed:  Current Facility-Administered Medications   Medication Dose Route Frequency    0.9% sodium chloride infusion  100 mL/hr IntraVENous CONTINUOUS    aspirin delayed-release tablet 81 mg  81 mg Oral DAILY    atorvastatin (LIPITOR) tablet 80 mg  80 mg Oral QHS    cetirizine (ZYRTEC) tablet 10 mg  10 mg Oral BID    clopidogrel (PLAVIX) tablet 75 mg  75 mg Oral DAILY    escitalopram oxalate (LEXAPRO) tablet 20 mg  20 mg Oral DAILY    furosemide (LASIX) tablet 20 mg  20 mg Oral DAILY    gabapentin (NEURONTIN) capsule 300 mg  300 mg Oral BID    metoprolol tartrate (LOPRESSOR) tablet 12.5 mg  12.5 mg Oral BID    risperiDONE (RisperDAL) tablet 2 mg  2 mg Oral QHS    ondansetron (ZOFRAN) injection 2 mg  2 mg IntraVENous Q6H PRN    acetaminophen (TYLENOL) tablet 650 mg  650 mg Oral Q4H PRN    senna-docusate (PERICOLACE) 8.6-50 mg per tablet 2 Tab  2 Tab Oral QHS    albuterol (PROVENTIL VENTOLIN) nebulizer solution 2.5 mg  2.5 mg Nebulization K6C PRN    folic acid (FOLVITE) 1 mg, thiamine (B-1) 100 mg in 0.9% sodium chloride 50 mL ivpb   IntraVENous ACD    heparin (porcine) injection 5,000 Units  5,000 Units SubCUTAneous Q8H    nitroglycerin (NITROSTAT) tablet 0.4 mg  0.4 mg SubLINGual PRN    nicotine (NICODERM CQ) 14 mg/24 hr patch 1 Patch  1 Patch TransDERmal DAILY

## 2018-03-28 NOTE — PROGRESS NOTES
Care Management Interventions  PCP Verified by CM: Yes  Last Visit to PCP: 02/28/18  Mode of Transport at Discharge: Self  Transition of Care Consult (CM Consult): Discharge Planning  Current Support Network: Relative's Home  Confirm Follow Up Transport: Self  Plan discussed with Pt/Family/Caregiver:  Yes

## 2018-03-28 NOTE — PROGRESS NOTES
NUTRITION  Patient/Family Education Record    FACTORS THAT MAY INFLUENCE PATIENTS ABILITY TO LEARN:   []   Language barrier    []   Cultural needs   [x]   Motivation    []   Cognitive limitation    []   Physical   []   Education   []   Physiological factors   []   Hearing/vision/speaking impairment   []   Denominational beliefs    []   Financial limitations    []  Other:   []   No barriers limiting ability to learn     Person Instructed:   [x]   Patient   []   Family   []  Other     Preference for Learning:   [x]   Verbal   [x]   Written   []  Demonstration     Patient educated on:   [x] Cardiac/heart healthy diet  [x] 2gm Sodium diet  [] Vitamin K regulated diet (coumadin)  [x] Weight loss/portion control  [] High protein  [] Other:    Goal:  Patient will demonstrate understanding of modified diet by  Implementing 1-2 recommendations once D/C to home    Outcome:   [x]  Patient verbalized understanding of education   []  Patient declined education  []  Patient needs follow up education; scheduled date for follow up:  [x]  Written information provided  []  RD contact information provided    Vasiliy Lazaro

## 2018-03-29 VITALS
HEIGHT: 61 IN | BODY MASS INDEX: 27.38 KG/M2 | OXYGEN SATURATION: 96 % | RESPIRATION RATE: 14 BRPM | DIASTOLIC BLOOD PRESSURE: 70 MMHG | SYSTOLIC BLOOD PRESSURE: 111 MMHG | WEIGHT: 145 LBS | HEART RATE: 70 BPM | TEMPERATURE: 97.7 F

## 2018-03-29 LAB
ANION GAP SERPL CALC-SCNC: 8 MMOL/L (ref 3–18)
BUN SERPL-MCNC: 15 MG/DL (ref 7–18)
BUN/CREAT SERPL: 16 (ref 12–20)
CALCIUM SERPL-MCNC: 8.9 MG/DL (ref 8.5–10.1)
CHLORIDE SERPL-SCNC: 110 MMOL/L (ref 100–108)
CO2 SERPL-SCNC: 23 MMOL/L (ref 21–32)
CREAT SERPL-MCNC: 0.95 MG/DL (ref 0.6–1.3)
ERYTHROCYTE [DISTWIDTH] IN BLOOD BY AUTOMATED COUNT: 13.3 % (ref 11.6–14.5)
GLUCOSE BLD STRIP.AUTO-MCNC: 101 MG/DL (ref 70–110)
GLUCOSE SERPL-MCNC: 81 MG/DL (ref 74–99)
HCT VFR BLD AUTO: 47.4 % (ref 36–48)
HGB BLD-MCNC: 15.7 G/DL (ref 13–16)
MCH RBC QN AUTO: 31.7 PG (ref 24–34)
MCHC RBC AUTO-ENTMCNC: 33.1 G/DL (ref 31–37)
MCV RBC AUTO: 95.6 FL (ref 74–97)
PLATELET # BLD AUTO: 252 K/UL (ref 135–420)
PMV BLD AUTO: 10.4 FL (ref 9.2–11.8)
POTASSIUM SERPL-SCNC: 4.1 MMOL/L (ref 3.5–5.5)
RBC # BLD AUTO: 4.96 M/UL (ref 4.7–5.5)
SODIUM SERPL-SCNC: 141 MMOL/L (ref 136–145)
WBC # BLD AUTO: 8.3 K/UL (ref 4.6–13.2)

## 2018-03-29 PROCEDURE — 74011250637 HC RX REV CODE- 250/637: Performed by: INTERNAL MEDICINE

## 2018-03-29 PROCEDURE — 85027 COMPLETE CBC AUTOMATED: CPT | Performed by: NURSE PRACTITIONER

## 2018-03-29 PROCEDURE — 74011250637 HC RX REV CODE- 250/637: Performed by: NURSE PRACTITIONER

## 2018-03-29 PROCEDURE — 99218 HC RM OBSERVATION: CPT

## 2018-03-29 PROCEDURE — 80048 BASIC METABOLIC PNL TOTAL CA: CPT | Performed by: NURSE PRACTITIONER

## 2018-03-29 PROCEDURE — 96372 THER/PROPH/DIAG INJ SC/IM: CPT

## 2018-03-29 PROCEDURE — 74011250636 HC RX REV CODE- 250/636: Performed by: INTERNAL MEDICINE

## 2018-03-29 PROCEDURE — 82962 GLUCOSE BLOOD TEST: CPT

## 2018-03-29 PROCEDURE — 74011250637 HC RX REV CODE- 250/637: Performed by: FAMILY MEDICINE

## 2018-03-29 PROCEDURE — 36415 COLL VENOUS BLD VENIPUNCTURE: CPT | Performed by: NURSE PRACTITIONER

## 2018-03-29 RX ORDER — AMOXICILLIN 250 MG
2 CAPSULE ORAL
Qty: 60 TAB | Refills: 0 | Status: SHIPPED | OUTPATIENT
Start: 2018-03-29

## 2018-03-29 RX ORDER — ASPIRIN 325 MG/1
100 TABLET, FILM COATED ORAL DAILY
Status: DISCONTINUED | OUTPATIENT
Start: 2018-03-29 | End: 2018-03-29 | Stop reason: HOSPADM

## 2018-03-29 RX ORDER — ASPIRIN 325 MG/1
100 TABLET, FILM COATED ORAL DAILY
Qty: 30 TAB | Refills: 0 | Status: SHIPPED | OUTPATIENT
Start: 2018-03-29

## 2018-03-29 RX ORDER — IBUPROFEN 200 MG
1 TABLET ORAL DAILY
Qty: 30 PATCH | Refills: 0 | Status: SHIPPED | OUTPATIENT
Start: 2018-03-30 | End: 2018-04-29

## 2018-03-29 RX ORDER — FOLIC ACID 1 MG/1
1 TABLET ORAL DAILY
Qty: 30 TAB | Refills: 0 | Status: SHIPPED | OUTPATIENT
Start: 2018-03-29

## 2018-03-29 RX ORDER — FOLIC ACID 1 MG/1
1 TABLET ORAL DAILY
Status: DISCONTINUED | OUTPATIENT
Start: 2018-03-29 | End: 2018-03-29 | Stop reason: HOSPADM

## 2018-03-29 RX ADMIN — ASPIRIN 81 MG: 81 TABLET, COATED ORAL at 09:27

## 2018-03-29 RX ADMIN — CLOPIDOGREL BISULFATE 75 MG: 75 TABLET, FILM COATED ORAL at 09:00

## 2018-03-29 RX ADMIN — CETIRIZINE HYDROCHLORIDE 10 MG: 10 TABLET, FILM COATED ORAL at 09:28

## 2018-03-29 RX ADMIN — HEPARIN SODIUM 5000 UNITS: 5000 INJECTION, SOLUTION INTRAVENOUS; SUBCUTANEOUS at 04:06

## 2018-03-29 RX ADMIN — GABAPENTIN 300 MG: 300 CAPSULE ORAL at 09:28

## 2018-03-29 RX ADMIN — ESCITALOPRAM OXALATE 20 MG: 20 TABLET, FILM COATED ORAL at 09:27

## 2018-03-29 RX ADMIN — TOPIRAMATE 50 MG: 25 TABLET, FILM COATED ORAL at 09:28

## 2018-03-29 NOTE — DISCHARGE SUMMARY
[facility] [place]    Patient: Elizabeth Yung MRN: [id number]  CSN: [id number]    YOB: 1960  Age: 59 y.o. Sex: male    DOA: 3/27/2018 LOS:  LOS: 0 days   Discharge Date:      Admission Diagnoses: TIA (transient ischemic attack)    Discharge Diagnoses:    Problem List as of 3/29/2018  Date Reviewed: 2/20/2015          Codes Class Noted - Resolved    Migraine headache without aura (Chronic) ICD-10-CM: G43.009  ICD-9-CM: 346.10  3/28/2018 - Present        * (Principal)TIA (transient ischemic attack) ICD-10-CM: G45.9  ICD-9-CM: 435.9  3/27/2018 - Present        S/P CABG x 3 ICD-10-CM: Z95.1  ICD-9-CM: V45.81  2/2/2016 - Present        NSTEMI (non-ST elevated myocardial infarction) ([facility] ICD-10-CM: I21.4  ICD-9-CM: 410.70  1/12/2016 - Present        Left inguinal hernia ICD-10-CM: K40.90  ICD-9-CM: 550.90  1/16/2015 - Present        Back pain ICD-10-CM: M54.9  ICD-9-CM: 724.5  11/2/2011 - Present    Overview Signed 11/2/2011 12:55 PM by DO Dr Becka Kaplan Locus               GI bleed ICD-10-CM: K92.2  ICD-9-CM: 578.9  11/2/2011 - Present              Discharge Condition: Stable    Discharge To: Home    Consults: Hospitalist and Neurology    Hospital Course: 76-year-old  male with past medical history significant for coronary artery disease with history of CABG, hypertension, hyperlipidemia, chronic back pain with some neuropathy, tobacco abuse, presents to the ER on 3/27/2018 with a one day history of left-sided facial numbness and left arm and leg numbness without any significant weakness.  The patient generally felt weak after having bypass in 2016, but denies any focal weakness.  Pt reports his facial numbness resolved while in the ED and left arm and left leg weakness improved. He denies any chest pain, shortness of breath or palpitations.   Code S initiated in the ED, tele-neurology consulted, negative CT head, UDS + THC, UA NEG, negative troponin x 3, triglyceride 154, total cholesterol 220, negative CXR. Neurology consulted. MRA brain negative. MRA neck no hemodynamically significant carotid stenosis based on NASCET criteria. 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 Pt reports resolution of symptoms. He is stable for discharge home, to follow up with PCP within one week and to follow up with neurology within 2-3 weeks. Physical Exam:  General appearance: alert, cooperative, no distress, appears stated age  Head: Normocephalic, without obvious abnormality, atraumatic  Lungs: clear to auscultation bilaterally  Heart: regular rate and rhythm, S1, S2 normal, no murmur, click, rub or gallop  Abdomen: soft, non tender, non distended. Normoactive bowel sounds.  No masses, no organomegaly  Extremities: extremities normal, atraumatic, no cyanosis or edema  Skin: Skin color, texture, turgor normal. No rashes or lesions  Neurologic: Grossly normal  PSY: Mood and affect normal, appropriately behaved    Significant Diagnostic Studies: labs:   Recent Results (from the past 24 hour(s))   GLUCOSE, POC    Collection Time: 03/28/18 11:49 AM   Result Value Ref Range    Glucose (POC) 108 70 - 110 mg/dL   CBC W/O DIFF    Collection Time: 03/29/18  5:40 AM   Result Value Ref Range    WBC 8.3 4.6 - 13.2 K/uL    RBC 4.96 4.70 - 5.50 M/uL    HGB 15.7 13.0 - 16.0 g/dL    HCT 47.4 36.0 - 48.0 %    MCV 95.6 74.0 - 97.0 FL    MCH 31.7 24.0 - 34.0 PG    MCHC 33.1 31.0 - 37.0 g/dL    RDW 13.3 11.6 - 14.5 %    PLATELET 417 983 - 472 K/uL    MPV 10.4 9.2 - 67.1 FL   METABOLIC PANEL, BASIC    Collection Time: 03/29/18  5:40 AM   Result Value Ref Range    Sodium 141 136 - 145 mmol/L    Potassium 4.1 3.5 - 5.5 mmol/L    Chloride 110 (H) 100 - 108 mmol/L    CO2 23 21 - 32 mmol/L    Anion gap 8 3.0 - 18 mmol/L    Glucose 81 74 - 99 mg/dL    BUN 15 7.0 - 18 MG/DL    Creatinine 0.95 0.6 - 1.3 MG/DL    BUN/Creatinine ratio 16 12 - 20      GFR est AA >60 >60 ml/min/1.73m2    GFR est non-AA >60 >60 ml/min/1.73m2    Calcium 8.9 8.5 - 10.1 MG/DL   GLUCOSE, POC    Collection Time: 03/29/18  8:07 AM   Result Value Ref Range    Glucose (POC) 101 70 - 110 mg/dL         Discharge Medications:     Current Discharge Medication List      START taking these medications    Details   folic acid (FOLVITE) 1 mg tablet Take 1 Tab by mouth daily. Qty: 30 Tab, Refills: 0      nicotine (NICODERM CQ) 14 mg/24 hr patch 1 Patch by TransDERmal route daily for 30 days. Qty: 30 Patch, Refills: 0      senna-docusate (PERICOLACE) 8.6-50 mg per tablet Take 2 Tabs by mouth nightly. Qty: 60 Tab, Refills: 0      Thiamine Mononitrate (B-1) 100 mg tablet Take 1 Tab by mouth daily. Qty: 30 Tab, Refills: 0         CONTINUE these medications which have NOT CHANGED    Details   clopidogrel (PLAVIX) 75 mg tab TAKE ONE TABLET BY MOUTH ONCE DAILY  Qty: 30 Tab, Refills: 6    Comments: Please consider 90 day supplies to promote better adherence      risperiDONE (RISPERDAL) 2 mg tablet Take 2 mg by mouth.      escitalopram oxalate (LEXAPRO) 20 mg tablet Take 20 mg by mouth daily. cetirizine (ZYRTEC) 10 mg tablet Take 1 Tab by mouth two (2) times a day. Qty: 30 Tab, Refills: 0      hydrOXYzine pamoate (VISTARIL) 25 mg capsule Take 1 Cap by mouth three (3) times daily as needed for Itching. Qty: 30 Cap, Refills: 0      permethrin (ELIMITE) 5 % topical cream apply sparingly as directed  Apply to entire body and leave on for 10-12 hours; may repeat in 2 weeks  Qty: 60 g, Refills: 1      atorvastatin (LIPITOR) 80 mg tablet TAKE TABLET BY MOUTH ONCE NIGHTLY  Qty: 30 Tab, Refills: 6      metoprolol tartrate (LOPRESSOR) 25 mg tablet Take 0.5 Tabs by mouth two (2) times a day.   Qty: 30 Tab, Refills: 6      furosemide (LASIX) 20 mg tablet Take  by mouth daily. aspirin delayed-release 81 mg tablet Take 1 Tab by mouth daily. Qty: 30 Tab, Refills: 7      butalbital-acetaminophen-caffeine (FIORICET, ESGIC) -40 mg per tablet Take 1 Tab by mouth two (2) times daily as needed for Pain. nitroglycerin (NITROSTAT) 0.4 mg SL tablet 1 Tab by SubLINGual route every five (5) minutes as needed for Chest Pain. Qty: 1 Bottle, Refills: 3      topiramate (TOPAMAX) 50 mg tablet Take 50 mg by mouth two (2) times a day. Indications: MIGRAINE PREVENTION      gabapentin (NEURONTIN) 300 mg capsule Take 1 capsule by mouth once in the morning and 2 capsules at bedtime for leg cramps. Activity: Activity as tolerated    Diet: Cardiac Diet    Wound Care: None needed    Follow-up: Follow up with PCP within 3-5 days. Follow up with neurology within 2-3 weeks.     Discharge time: > 35 mins  Frances Springer NP  3/29/2018, 10:04 AM

## 2018-03-29 NOTE — PROGRESS NOTES
Care Management Interventions  PCP Verified by CM: Yes  Last Visit to PCP: 02/28/18  Palliative Care Criteria Met (RRAT>21 & CHF Dx)?: No  Mode of Transport at Discharge:  Other (see comment)  Transition of Care Consult (CM Consult): Discharge Planning  Discharge Durable Medical Equipment: No  Physical Therapy Consult: Yes  Occupational Therapy Consult: Yes  Speech Therapy Consult: No  Current Support Network: Relative's Home  Confirm Follow Up Transport: Self  Plan discussed with Pt/Family/Caregiver: Yes  Discharge Location  Discharge Placement: Home

## 2018-03-29 NOTE — PROGRESS NOTES
1945: Bedside verbal shift report received from [facility], [address]. Pt is awake in bed, no signs of distress, instructed to press call light if assistance is needed, call light within reach. 2000: Pt taken to MRI  2045: Pt back from MRI    0725: Bedside and Verbal shift change report given to MATT Newman (oncoming nurse) by Liam Thompson RN (offgoing nurse). Report included the following information SBAR, Kardex, Intake/Output and Recent Results.

## 2018-03-29 NOTE — PROGRESS NOTES
Assumed care of pt from MATT Herrera pt awake in bed A&O x 4 , no distress noted and denies pain. Frequently used items and call bell within reach. Patient verbalized understanding to use call bell for any needs or assistance. Bed locked in lowest position. Dual NIH performed. 1100Pt discharged home with no distress noted, accompanied by MATT Newman via  w/c to front entrance to vehicle. Pt has recieved discharge instructions, along with prescription and belongings. Voiced understanding of discharge instructions. Patient given [facility] Stroke Education Binder, Stroke Handouts or Verbal Education.

## 2018-03-29 NOTE — DISCHARGE INSTRUCTIONS
DISCHARGE SUMMARY from Nurse    PATIENT INSTRUCTIONS:    After general anesthesia or intravenous sedation, for 24 hours or while taking prescription Narcotics:  · Limit your activities  · Do not drive and operate hazardous machinery  · Do not make important personal or business decisions  · Do  not drink alcoholic beverages  · If you have not urinated within 8 hours after discharge, please contact your surgeon on call. Report the following to your surgeon:  · Excessive pain, swelling, redness or odor of or around the surgical area  · Temperature over 100.5  · Nausea and vomiting lasting longer than 4 hours or if unable to take medications  · Any signs of decreased circulation or nerve impairment to extremity: change in color, persistent  numbness, tingling, coldness or increase pain  · Any questions    What to do at Home:  Recommended activity: Activity as tolerated,     If you experience any of the following symptoms as listed under \" When should I call for help? \" in your discharge teaching  , please follow up with your Doctor and/ or call 911. *  Please give a list of your current medications to your Primary Care Provider. *  Please update this list whenever your medications are discontinued, doses are      changed, or new medications (including over-the-counter products) are added. *  Please carry medication information at all times in case of emergency situations. These are general instructions for a healthy lifestyle:    No smoking/ No tobacco products/ Avoid exposure to second hand smoke  Surgeon General's Warning:  Quitting smoking now greatly reduces serious risk to your health.     Obesity, smoking, and sedentary lifestyle greatly increases your risk for illness    A healthy diet, regular physical exercise & weight monitoring are important for maintaining a healthy lifestyle    You may be retaining fluid if you have a history of heart failure or if you experience any of the following symptoms:  Weight gain of 3 pounds or more overnight or 5 pounds in a week, increased swelling in our hands or feet or shortness of breath while lying flat in bed. Please call your doctor as soon as you notice any of these symptoms; do not wait until your next office visit. Recognize signs and symptoms of STROKE:    F-face looks uneven    A-arms unable to move or move unevenly    S-speech slurred or non-existent    T-time-call 911 as soon as signs and symptoms begin-DO NOT go       Back to bed or wait to see if you get better-TIME IS BRAIN. Warning Signs of HEART ATTACK     Call 911 if you have these symptoms:   Chest discomfort. Most heart attacks involve discomfort in the center of the chest that lasts more than a few minutes, or that goes away and comes back. It can feel like uncomfortable pressure, squeezing, fullness, or pain.  Discomfort in other areas of the upper body. Symptoms can include pain or discomfort in one or both arms, the back, neck, jaw, or stomach.  Shortness of breath with or without chest discomfort.  Other signs may include breaking out in a cold sweat, nausea, or lightheadedness. Don't wait more than five minutes to call 911 - MINUTES MATTER! Fast action can save your life. Calling 911 is almost always the fastest way to get lifesaving treatment. Emergency Medical Services staff can begin treatment when they arrive -- up to an hour sooner than if someone gets to the hospital by car. The discharge information has been reviewed with the patient. The patient verbalized understanding. Discharge medications reviewed with the patient and appropriate educational materials and side effects teaching were provided. Patient armband removed and shredded    Patient given [facility] Stroke Education Binder, Stroke Handouts or Verbal Education.      ___________________________________________________________________________________________________________________________________

## 2018-03-29 NOTE — ADT AUTH CERT NOTES
Patient Demographics        Patient Name [address] Sex  Address Phone       Miguel Godoy [id number] Male 1960 [address] [phone] (Home)  [phone] (Mobile) *Preferred*           CSN:       [id number]           Admit Date: Admit Time Room Bed       Mar 27, 2018  2:48 PM 2117 [[id number]] [id number] [[id number]]           Attending Providers        Provider Pager From To       Theodosia Merlin, MD  18       Suyapa Chavez MD  18       Eloisa Berry MD  18           Emergency Contact(s)        Name Relation Home Work Sirisha Milligan   [phone]       Lidia Mata   [phone]         Utilization Review            review, addit. info by Mike Tellez        Review Entered Review Status       3/29/2018 In Primary       Details         Correction ; the review dated  -- is  for . (Some  data is listed there- and noted as 3/27)  ------ ; VS 97.9  °F (36.6  °C) 72 121/82    Supine R 17 SAT 95 % RA.   ---------labs; troponin < 0.02.   ---------meds; alb neb, ASA po 81 mg daily,   lipitor po daily, plavix po daily, hep sq q 8h,   lopressor ordered for bid held in am, NS iv 500   ml x 2. NS iv at 100/h ran and then was stopped.  --------(Medicine note there is for .)      --------Additional info for  is below;   ------- Neuro Consult ; Assessment:    TIA versus Migraine     Plan:    TIA/Stroke   Work-up in progress.  MRI/A has been performed and pending.  ECHO Pending.       ASA 81mg + Plavix 75mg daily- was on both PTA  .2- Atorvastatin 80mg in place. THC +- Cessation.     Nothing acute on CT.      He had episodes of hypotension during night.  Coincides soon after nitro and metoprolol  given.  May need adjustments- defer to IM.        Migraines  Has history of migraines and was on Topimax 50mg BID for prevention.  While the HA he had on admission was different than his usual migraines he may have had a complicated migraine this time.  Will resume Topamax.  With the irregular dosing, he can have rebound headaches.     Thank-you for this consult. Magalie Alvarez will follow along with you.     Davonna Essex is a 63yo  male with PMH of CAD s/p CABG x 4, HTN, HLD, chronic pain, tobacco abuse, GI Bleed, medication noncompliance who presents to the emergency department for evaluation of headaches, chest pain, left leg numbness/weakness, and left facial heaviness since 1030 or 11am 3/27/18.  He tried NGT but denies improvements.  He had headaches off and on for a few days.  Left sided weakness started about 4.5 hours PTA. [facility] REBOLLEDO was called and he was seen by teleneurology.       The headache was on the left temporal area and he describes as 10/10, sharp, and throbbing.  He states he was taking his topamax then but admits that he takes the medication irregularly and ran out 2 days ago.  The numbness started > 4.5 hours before coming to the ED along with the left facial \"swelling\".  Numbness was more to his leg than arm.        -------------MRI BRAIN March 28; 1.  No acute infarct, hemorrhage, mass effect, or herniation.     2.  Mild amount of white matter disease. This finding is quite nonspecific and  is not particularly suggestive of a demyelinating process although not  absolutely excluded.  These findings may represent commonly seen very mild  chronic small vessel ischemic changes. Mild background white matter changes  related to migraine headaches is possible.     3. Very small inferolateral right frontal venous angioma.  These findings are  typically asymptomatic.     4. 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     ---------MRA brain and neck negative.                      Transient Ischemic Attack (TIA) - Care Day 1 (3/27/2018) by Josi Pedro        Review Entered Review Status       3/28/2018 Completed       Details              Care Day: 1 Care Date: 3/27/2018 Level of Care: Telemetry       Guideline Day 1        Level Of Care       (X) Floor, intermediate care, or telemetry care [D]       3/28/2018 4:35 PM EDT by Julita Mark. Observation order March 27                     Clinical Status       ( ) * Clinical Indications met [E]       3/28/2018 4:35 PM EDT by Ragini Alberts         TIA - left side facial numbness, left side extremity numbness - symptoms improved while in ED                     Activity       (X) As tolerated       3/28/2018 4:35 PM EDT by Ragini Alberts         Ambulatory  ; independent. PER PT                     Routes       (X) Oral hydration       (X) Diet as tolerated       3/28/2018 4:35 PM EDT by Ragini Alberts         card diet                     Interventions       (X) Possible cardiac evaluation (eg, echocardiogram)       3/28/2018 4:35 PM EDT by Viveca Gold       Echo pending              3/28/2018 4:35 PM EDT by Ragini Alberts         [ EKG 3/27;  Sinus bradycardia  51 Nonspecific ST and T wave abnormality  Abnormal ECG  When compared with ECG of 02-FEB-2016 12:48,  T wave inversion no longer evident in Inferior leads  T wave inversion no longer evident in Anterolateral leads ]              (X) Possible arterial and head imaging       3/28/2018 4:35 PM EDT by Ragini Alberts         HEAD CT negative 3/27              (X) Control blood pressure       3/28/2018 4:35 PM EDT by Ragini Alberts         lopressor ordered for bid held in am                     Medications       (X) Antiplatelet therapy       3/28/2018 4:35 PM EDT by Ragini Alberts         plavix  po daily,                     3/28/2018 4:39 PM EDT by Ragini Alberts       Subject: Additional Clinical Information       VS 97.9  °F (36.6  °C) 72 121/82   Supine R 17 SAT 95 % RA. ---------labs; troponin < 0.02. ---------meds; alb neb, ASA po 81 mg daily, lipitor po daily, plavix po daily, hep sq q 8h, lopressor ordered for bid held in am, NS iv 500 ml x 2.  NS iv at 100/h ran and then was stopped.--------                                   * Milestone              Additional Notes       -----------Per MEDICINE TEAM;        No acute events overnight.  Pt reports resolution of symptoms, states he came to the ED because he thought he was having a stroke.  This has never happened to him before.  Denies recent stressors or changes in medication regimen.       A/P:       TIA       - left side facial numbness, left side extremity numbness - symptoms improved while in ED       - tele neuro consulted in ED       - CT head 3/27/2018 negative       - MRI brain, MRA brain, MRA neck pending       - Echo pending       - ASA 81 mg daily, Lipitor 80 mg nightly, Folic Acid/Thiamine daily, pt on Plavix 75 mg daily       - PT/OT, speech        - Neuro checks       - Neuro consult               Hypertension, hx of CABG x 3       - Lopressor 12.5 mg BID               Chronic back pain w/ neuropathy               Tobacco abuse- encourage cessation.               DVT Prophylaxis- Heparin subcutaneously TID

## 2018-03-30 ENCOUNTER — HOME HEALTH ADMISSION (OUTPATIENT)
Dept: HOME HEALTH SERVICES | Facility: HOME HEALTH | Age: 58
End: 2018-03-30

## 2018-04-02 ENCOUNTER — HOME CARE VISIT (OUTPATIENT)
Dept: HOME HEALTH SERVICES | Facility: HOME HEALTH | Age: 58
End: 2018-04-02

## 2018-04-23 ENCOUNTER — HOSPITAL ENCOUNTER (OUTPATIENT)
Dept: RESPIRATORY THERAPY | Age: 58
Discharge: HOME | End: 2018-04-23
Attending: FAMILY MEDICINE
Payer: MEDICAID

## 2018-04-23 DIAGNOSIS — F41.9 ANXIETY: ICD-10-CM

## 2018-04-23 PROCEDURE — 94729 DIFFUSING CAPACITY: CPT

## 2018-04-23 PROCEDURE — 94060 EVALUATION OF WHEEZING: CPT

## 2018-04-23 PROCEDURE — 94727 GAS DIL/WSHOT DETER LNG VOL: CPT

## 2018-06-11 RX ORDER — FUROSEMIDE 20 MG/1
TABLET ORAL
Qty: 30 TAB | Refills: 6 | Status: SHIPPED | OUTPATIENT
Start: 2018-06-11

## 2018-07-31 RX ORDER — ATORVASTATIN CALCIUM 80 MG/1
TABLET, FILM COATED ORAL
Qty: 30 TAB | Refills: 6 | Status: SHIPPED | OUTPATIENT
Start: 2018-07-31

## 2018-09-11 RX ORDER — NITROGLYCERIN 0.4 MG/1
TABLET SUBLINGUAL
Qty: 30 TAB | Refills: 3 | Status: SHIPPED | OUTPATIENT
Start: 2018-09-11 | End: 2019-07-11 | Stop reason: SDUPTHER

## 2018-09-11 RX ORDER — METOPROLOL TARTRATE 25 MG/1
TABLET, FILM COATED ORAL
Qty: 30 TAB | Refills: 6 | Status: SHIPPED | OUTPATIENT
Start: 2018-09-11 | End: 2019-08-08 | Stop reason: SDUPTHER

## 2018-10-15 RX ORDER — CLOPIDOGREL BISULFATE 75 MG/1
TABLET ORAL
Qty: 30 TAB | Refills: 6 | Status: SHIPPED | OUTPATIENT
Start: 2018-10-15 | End: 2019-12-30

## 2019-07-12 RX ORDER — NITROGLYCERIN 0.4 MG/1
TABLET SUBLINGUAL
Qty: 60 TAB | Refills: 3 | Status: SHIPPED | OUTPATIENT
Start: 2019-07-12 | End: 2020-10-01

## 2019-08-12 RX ORDER — METOPROLOL TARTRATE 25 MG/1
TABLET, FILM COATED ORAL
Qty: 30 TAB | Refills: 6 | Status: SHIPPED | OUTPATIENT
Start: 2019-08-12

## 2019-12-30 RX ORDER — CLOPIDOGREL BISULFATE 75 MG/1
TABLET ORAL
Qty: 60 TAB | Refills: 5 | Status: SHIPPED | OUTPATIENT
Start: 2019-12-30

## 2020-10-01 RX ORDER — NITROGLYCERIN 0.4 MG/1
TABLET SUBLINGUAL
Qty: 50 TAB | Refills: 3 | Status: SHIPPED | OUTPATIENT
Start: 2020-10-01 | End: 2022-09-02

## 2021-01-04 ENCOUNTER — TRANSCRIBE ORDER (OUTPATIENT)
Dept: SCHEDULING | Age: 61
End: 2021-01-04

## 2021-01-04 DIAGNOSIS — K43.9 EPIGASTRIC HERNIA: Primary | ICD-10-CM

## 2021-01-22 ENCOUNTER — HOSPITAL ENCOUNTER (OUTPATIENT)
Dept: LAB | Age: 61
Discharge: HOME | End: 2021-01-22
Payer: MEDICAID

## 2021-01-22 ENCOUNTER — HOSPITAL ENCOUNTER (OUTPATIENT)
Dept: GENERAL RADIOLOGY | Age: 61
Discharge: HOME | End: 2021-01-22
Payer: MEDICAID

## 2021-01-22 ENCOUNTER — TRANSCRIBE ORDER (OUTPATIENT)
Dept: REGISTRATION | Age: 61
End: 2021-01-22

## 2021-01-22 DIAGNOSIS — J44.9 OBSTRUCTIVE CHRONIC BRONCHITIS WITHOUT EXACERBATION (HCC): Primary | ICD-10-CM

## 2021-01-22 DIAGNOSIS — J44.9 OBSTRUCTIVE CHRONIC BRONCHITIS WITHOUT EXACERBATION (HCC): ICD-10-CM

## 2021-01-22 LAB
BASOPHILS # BLD: 0 K/UL (ref 0–0.1)
BASOPHILS NFR BLD: 0 % (ref 0–2)
DIFFERENTIAL METHOD BLD: ABNORMAL
EOSINOPHIL # BLD: 0.1 K/UL (ref 0–0.4)
EOSINOPHIL NFR BLD: 1 % (ref 0–5)
ERYTHROCYTE [DISTWIDTH] IN BLOOD BY AUTOMATED COUNT: 12.9 % (ref 11.6–14.5)
HCT VFR BLD AUTO: 53.1 % (ref 36–48)
HGB BLD-MCNC: 17.2 G/DL (ref 13–16)
LYMPHOCYTES # BLD: 3.9 K/UL (ref 0.9–3.6)
LYMPHOCYTES NFR BLD: 41 % (ref 21–52)
MCH RBC QN AUTO: 32.3 PG (ref 24–34)
MCHC RBC AUTO-ENTMCNC: 32.4 G/DL (ref 31–37)
MCV RBC AUTO: 99.6 FL (ref 74–97)
MONOCYTES # BLD: 0.6 K/UL (ref 0.05–1.2)
MONOCYTES NFR BLD: 7 % (ref 3–10)
NEUTS SEG # BLD: 5 K/UL (ref 1.8–8)
NEUTS SEG NFR BLD: 51 % (ref 40–73)
PLATELET # BLD AUTO: 272 K/UL (ref 135–420)
PMV BLD AUTO: 10.2 FL (ref 9.2–11.8)
RBC # BLD AUTO: 5.33 M/UL (ref 4.7–5.5)
WBC # BLD AUTO: 9.6 K/UL (ref 4.6–13.2)

## 2021-01-22 PROCEDURE — 36415 COLL VENOUS BLD VENIPUNCTURE: CPT

## 2021-01-22 PROCEDURE — 86003 ALLG SPEC IGE CRUDE XTRC EA: CPT

## 2021-01-22 PROCEDURE — 82785 ASSAY OF IGE: CPT

## 2021-01-22 PROCEDURE — 71046 X-RAY EXAM CHEST 2 VIEWS: CPT

## 2021-01-22 PROCEDURE — 85025 COMPLETE CBC W/AUTO DIFF WBC: CPT

## 2021-01-25 LAB
A ALTERNATA IGE QN: <0.1 KU/L
A FUMIGATUS IGE QN: <0.1 KU/L
AMER ROACH IGE QN: 0.21 KU/L
AMER SYCAMORE IGE QN: <0.1 KU/L
BAHIA GRASS IGE QN: <0.1 KU/L
BERMUDA GRASS IGE QN: <0.1 KU/L
BOXELDER IGE QN: <0.1 KU/L
C HERBARUM IGE QN: <0.1 KU/L
CAT DANDER IGG QN: <0.1 KU/L
CLASS DESCRIPTION, 600268: ABNORMAL
COMMON RAGWEED IGE QN: <0.1 KU/L
D FARINAE IGE QN: <0.1 KU/L
D PTERONYSS IGE QN: <0.1 KU/L
DEPRECATED IGE QN: <0.1 KU/L
DOG DANDER IGE QN: <0.1 KU/L
ENGL PLANTAIN IGE QN: <0.1 KU/L
IGE SERPL-ACNC: 23 IU/ML (ref 6–495)
JOHNSON GRASS IGE QN: <0.1 KU/L
M RACEMOSUS IGE QN: <0.1 KU/L
MT JUNIPER IGE QN: <0.1 KU/L
MUGWORT IGE QN: <0.1 KU/L
NETTLE IGE QN: <0.1 KU/L
P NOTATUM IGE QN: <0.1 KU/L
S BOTRYOSUM IGE QN: <0.1 KU/L
SHEEP SORREL IGE QN: <0.1 KU/L
SWEET GUM IGE QN: <0.1 KU/L
TIMOTHY IGE QN: <0.1 KU/L
WHITE BIRCH IGE QN: <0.1 KU/L
WHITE ELM IGG QN: <0.1 KU/L
WHITE HICKORY IGE QN: <0.1 KU/L
WHITE MULBERRY IGE QN: <0.1 KU/L
WHITE OAK IGE QN: <0.1 KU/L

## 2021-02-03 ENCOUNTER — TRANSCRIBE ORDER (OUTPATIENT)
Dept: SCHEDULING | Age: 61
End: 2021-02-03

## 2021-02-03 DIAGNOSIS — R06.02 SOB (SHORTNESS OF BREATH): Primary | ICD-10-CM

## 2021-03-19 ENCOUNTER — TRANSCRIBE ORDER (OUTPATIENT)
Dept: SCHEDULING | Age: 61
End: 2021-03-19

## 2021-03-19 DIAGNOSIS — R06.89 DYSPNEA AND RESPIRATORY ABNORMALITY: Primary | ICD-10-CM

## 2021-03-19 DIAGNOSIS — R06.00 DYSPNEA AND RESPIRATORY ABNORMALITY: Primary | ICD-10-CM

## 2022-01-14 RX ORDER — NITROGLYCERIN 0.4 MG/1
0.4 TABLET SUBLINGUAL
Qty: 25 TABLET | Refills: 3 | OUTPATIENT
Start: 2022-01-14

## 2022-09-02 RX ORDER — NITROGLYCERIN 0.4 MG/1
TABLET SUBLINGUAL
Qty: 50 TABLET | Refills: 0 | Status: SHIPPED | OUTPATIENT
Start: 2022-09-02